# Patient Record
Sex: FEMALE | Race: WHITE | Employment: OTHER | URBAN - NONMETROPOLITAN AREA
[De-identification: names, ages, dates, MRNs, and addresses within clinical notes are randomized per-mention and may not be internally consistent; named-entity substitution may affect disease eponyms.]

---

## 2017-01-05 ENCOUNTER — OFFICE VISIT (OUTPATIENT)
Dept: OPHTHALMOLOGY | Age: 62
End: 2017-01-05

## 2017-01-05 DIAGNOSIS — H40.10X2 COAG (CHRONIC OPEN-ANGLE GLAUCOMA), MODERATE STAGE: Primary | ICD-10-CM

## 2017-01-05 PROCEDURE — 92133 CPTRZD OPH DX IMG PST SGM ON: CPT | Performed by: OPHTHALMOLOGY

## 2017-01-05 PROCEDURE — 92083 EXTENDED VISUAL FIELD XM: CPT | Performed by: OPHTHALMOLOGY

## 2017-01-05 RX ORDER — PHENYLEPHRINE HCL 2.5 %
1 DROPS OPHTHALMIC (EYE) ONCE
Status: COMPLETED | OUTPATIENT
Start: 2017-01-05 | End: 2017-01-05

## 2017-01-05 RX ORDER — TROPICAMIDE 10 MG/ML
1 SOLUTION/ DROPS OPHTHALMIC ONCE
Status: COMPLETED | OUTPATIENT
Start: 2017-01-05 | End: 2017-01-05

## 2017-01-05 RX ADMIN — Medication 1 DROP: at 14:48

## 2017-01-05 RX ADMIN — TROPICAMIDE 1 DROP: 10 SOLUTION/ DROPS OPHTHALMIC at 14:49

## 2017-02-07 ENCOUNTER — OFFICE VISIT (OUTPATIENT)
Dept: OPHTHALMOLOGY | Age: 62
End: 2017-02-07

## 2017-02-07 DIAGNOSIS — Z96.1 PSEUDOPHAKIA OF BOTH EYES: ICD-10-CM

## 2017-02-07 DIAGNOSIS — H47.20: ICD-10-CM

## 2017-02-07 DIAGNOSIS — H40.10X2 COAG (CHRONIC OPEN-ANGLE GLAUCOMA), MODERATE STAGE: Primary | ICD-10-CM

## 2017-02-07 PROCEDURE — 3017F COLORECTAL CA SCREEN DOC REV: CPT | Performed by: OPHTHALMOLOGY

## 2017-02-07 PROCEDURE — G8421 BMI NOT CALCULATED: HCPCS | Performed by: OPHTHALMOLOGY

## 2017-02-07 PROCEDURE — 99214 OFFICE O/P EST MOD 30 MIN: CPT | Performed by: OPHTHALMOLOGY

## 2017-02-07 PROCEDURE — G8427 DOCREV CUR MEDS BY ELIG CLIN: HCPCS | Performed by: OPHTHALMOLOGY

## 2017-02-07 PROCEDURE — G8484 FLU IMMUNIZE NO ADMIN: HCPCS | Performed by: OPHTHALMOLOGY

## 2017-02-07 PROCEDURE — 3014F SCREEN MAMMO DOC REV: CPT | Performed by: OPHTHALMOLOGY

## 2017-02-07 PROCEDURE — 1036F TOBACCO NON-USER: CPT | Performed by: OPHTHALMOLOGY

## 2017-02-07 RX ORDER — PHENYLEPHRINE HCL 2.5 %
1 DROPS OPHTHALMIC (EYE) ONCE
Status: COMPLETED | OUTPATIENT
Start: 2017-02-07 | End: 2017-02-07

## 2017-02-07 RX ORDER — BENOXINATE HCL/FLUORESCEIN SOD 0.4%-0.25%
1 DROPS OPHTHALMIC (EYE) ONCE
Status: COMPLETED | OUTPATIENT
Start: 2017-02-07 | End: 2017-02-07

## 2017-02-07 RX ORDER — TROPICAMIDE 10 MG/ML
1 SOLUTION/ DROPS OPHTHALMIC ONCE
Status: COMPLETED | OUTPATIENT
Start: 2017-02-07 | End: 2017-02-07

## 2017-02-07 RX ADMIN — Medication 1 DROP: at 10:39

## 2017-02-07 RX ADMIN — Medication 1 DROP: at 10:38

## 2017-02-07 RX ADMIN — TROPICAMIDE 1 DROP: 10 SOLUTION/ DROPS OPHTHALMIC at 10:39

## 2017-02-07 ASSESSMENT — ENCOUNTER SYMPTOMS
GASTROINTESTINAL NEGATIVE: 1
RESPIRATORY NEGATIVE: 1

## 2017-02-07 ASSESSMENT — VISUAL ACUITY
METHOD: SNELLEN - LINEAR
CORRECTION_TYPE: GLASSES
OS_CC: 20/100

## 2017-02-07 ASSESSMENT — TONOMETRY
OS_IOP_MMHG: 15
IOP_METHOD: APPLANATION W FLURESS DROP
OD_IOP_MMHG: 18

## 2017-02-07 ASSESSMENT — SLIT LAMP EXAM - LIDS
COMMENTS: NORMAL
COMMENTS: NORMAL

## 2017-07-06 ENCOUNTER — OFFICE VISIT (OUTPATIENT)
Dept: OPHTHALMOLOGY | Age: 62
End: 2017-07-06
Payer: COMMERCIAL

## 2017-07-06 DIAGNOSIS — H40.10X2 COAG (CHRONIC OPEN-ANGLE GLAUCOMA), MODERATE STAGE: Primary | ICD-10-CM

## 2017-07-06 DIAGNOSIS — H47.20: ICD-10-CM

## 2017-07-06 DIAGNOSIS — Z96.1 PSEUDOPHAKIA OF BOTH EYES: ICD-10-CM

## 2017-07-06 PROCEDURE — G8427 DOCREV CUR MEDS BY ELIG CLIN: HCPCS | Performed by: OPHTHALMOLOGY

## 2017-07-06 PROCEDURE — 99213 OFFICE O/P EST LOW 20 MIN: CPT | Performed by: OPHTHALMOLOGY

## 2017-07-06 PROCEDURE — 3017F COLORECTAL CA SCREEN DOC REV: CPT | Performed by: OPHTHALMOLOGY

## 2017-07-06 PROCEDURE — G8421 BMI NOT CALCULATED: HCPCS | Performed by: OPHTHALMOLOGY

## 2017-07-06 PROCEDURE — 3014F SCREEN MAMMO DOC REV: CPT | Performed by: OPHTHALMOLOGY

## 2017-07-06 PROCEDURE — 1036F TOBACCO NON-USER: CPT | Performed by: OPHTHALMOLOGY

## 2017-07-06 RX ORDER — BENOXINATE HCL/FLUORESCEIN SOD 0.4%-0.25%
1 DROPS OPHTHALMIC (EYE) ONCE
Status: COMPLETED | OUTPATIENT
Start: 2017-07-06 | End: 2017-07-06

## 2017-07-06 RX ADMIN — Medication 1 DROP: at 11:22

## 2017-07-06 ASSESSMENT — TONOMETRY
IOP_METHOD: APPLANATION W FLURESS DROP
OS_IOP_MMHG: 14
OD_IOP_MMHG: 15

## 2017-07-06 ASSESSMENT — VISUAL ACUITY
METHOD: SNELLEN - LINEAR
CORRECTION_TYPE: GLASSES
OS_CC: 20/200

## 2017-07-06 ASSESSMENT — ENCOUNTER SYMPTOMS
GASTROINTESTINAL NEGATIVE: 1
RESPIRATORY NEGATIVE: 1

## 2017-07-06 ASSESSMENT — SLIT LAMP EXAM - LIDS
COMMENTS: NORMAL
COMMENTS: NORMAL

## 2017-09-01 RX ORDER — TIMOLOL MALEATE 5 MG/ML
SOLUTION OPHTHALMIC
Qty: 5 ML | Refills: 11 | Status: SHIPPED | OUTPATIENT
Start: 2017-09-01 | End: 2020-03-16 | Stop reason: SDUPTHER

## 2017-11-06 ENCOUNTER — OFFICE VISIT (OUTPATIENT)
Dept: OPHTHALMOLOGY | Age: 62
End: 2017-11-06
Payer: COMMERCIAL

## 2017-11-06 DIAGNOSIS — Z96.1 PSEUDOPHAKIA OF BOTH EYES: ICD-10-CM

## 2017-11-06 DIAGNOSIS — H40.9 MODERATE STAGE GLAUCOMA: Primary | ICD-10-CM

## 2017-11-06 PROCEDURE — G8484 FLU IMMUNIZE NO ADMIN: HCPCS | Performed by: OPHTHALMOLOGY

## 2017-11-06 PROCEDURE — 3017F COLORECTAL CA SCREEN DOC REV: CPT | Performed by: OPHTHALMOLOGY

## 2017-11-06 PROCEDURE — G8427 DOCREV CUR MEDS BY ELIG CLIN: HCPCS | Performed by: OPHTHALMOLOGY

## 2017-11-06 PROCEDURE — 99214 OFFICE O/P EST MOD 30 MIN: CPT | Performed by: OPHTHALMOLOGY

## 2017-11-06 PROCEDURE — G8421 BMI NOT CALCULATED: HCPCS | Performed by: OPHTHALMOLOGY

## 2017-11-06 PROCEDURE — 1036F TOBACCO NON-USER: CPT | Performed by: OPHTHALMOLOGY

## 2017-11-06 PROCEDURE — 3014F SCREEN MAMMO DOC REV: CPT | Performed by: OPHTHALMOLOGY

## 2017-11-06 RX ORDER — BENOXINATE HCL/FLUORESCEIN SOD 0.4%-0.25%
1 DROPS OPHTHALMIC (EYE) ONCE
Status: COMPLETED | OUTPATIENT
Start: 2017-11-06 | End: 2017-11-06

## 2017-11-06 RX ADMIN — Medication 1 DROP: at 11:06

## 2017-11-06 ASSESSMENT — TONOMETRY
OS_IOP_MMHG: 14
OD_IOP_MMHG: 18

## 2017-11-06 ASSESSMENT — ENCOUNTER SYMPTOMS
RESPIRATORY NEGATIVE: 1
GASTROINTESTINAL NEGATIVE: 1

## 2017-11-06 ASSESSMENT — VISUAL ACUITY
METHOD: SNELLEN - LINEAR
CORRECTION_TYPE: GLASSES
OS_CC: 20/100

## 2017-11-06 ASSESSMENT — SLIT LAMP EXAM - LIDS
COMMENTS: NORMAL
COMMENTS: NORMAL

## 2017-11-06 NOTE — PROGRESS NOTES
Kimmie Boudreaux presents today for an IOP check   Chief Complaint   Patient presents with    Follow-up    Glaucoma   . HPI     Glaucoma   In both eyes. Comments   4 month IOP ck  Timolol  Maleate gel forming 1 drop OU every AM,  Last drop  am  No changes in health status since last appointment with me. Having no problems with any other organ system. I have reviewed the HPI I agree and will use it for the pt. HPI. Review of Systems  Review of Systems   Constitutional: Negative. HENT: Negative. Respiratory: Negative. Cardiovascular: Negative. Gastrointestinal: Negative. Musculoskeletal: Negative. Skin: Negative. Neurological: Negative. Endo/Heme/Allergies: Negative. Main Ophthalmology Exam     External Exam       Right Left    External Normal Normal          Slit Lamp Exam       Right Left    Lids/Lashes Normal Normal    Conjunctiva/Sclera White and quiet White and quiet    Cornea Clear Clear    Anterior Chamber Deep and quiet Deep and quiet    Iris Round and reactive Round and reactive    Lens Centered posterior chamber intraocular lens, Open posterior capsule Centered posterior chamber intraocular lens, Open posterior capsule    Vitreous Normal Normal                   Tonometry     Tonometry (11:10 AM)       Right Left    Pressure 18 14              Visual Acuity     Visual Acuity (Snellen - Linear)       Right Left    Dist cc 20/300 20/100    Correction:  Glasses          Visual Acuity #2 (Snellen - Linear)       Right Left    Dist cc 20/20 OU Bioptic RX     Correction:  Glasses              Pupils     Pupils       Pupils React APD    Right PERRL Slow Trace ? Left PERRL Slow None                Not recorded         Extraocular Movement     Extraocular Movement       Right Left     Full, Ortho Full, Ortho                IMPRESSION:  1. Moderate stage glaucoma     2.  Pseudophakia of both eyes         PLAN:  Discussed all below with

## 2018-01-10 ENCOUNTER — OFFICE VISIT (OUTPATIENT)
Dept: PRIMARY CARE CLINIC | Age: 63
End: 2018-01-10
Payer: COMMERCIAL

## 2018-01-10 VITALS
HEIGHT: 67 IN | DIASTOLIC BLOOD PRESSURE: 70 MMHG | HEART RATE: 78 BPM | BODY MASS INDEX: 26.68 KG/M2 | TEMPERATURE: 100.2 F | OXYGEN SATURATION: 94 % | WEIGHT: 170 LBS | SYSTOLIC BLOOD PRESSURE: 138 MMHG

## 2018-01-10 DIAGNOSIS — R50.9 FEVER, UNSPECIFIED FEVER CAUSE: ICD-10-CM

## 2018-01-10 DIAGNOSIS — J10.1 INFLUENZA A: Primary | ICD-10-CM

## 2018-01-10 LAB
INFLUENZA A ANTIBODY: POSITIVE
INFLUENZA B ANTIBODY: NEGATIVE

## 2018-01-10 PROCEDURE — 87804 INFLUENZA ASSAY W/OPTIC: CPT | Performed by: NURSE PRACTITIONER

## 2018-01-10 PROCEDURE — G8427 DOCREV CUR MEDS BY ELIG CLIN: HCPCS | Performed by: NURSE PRACTITIONER

## 2018-01-10 PROCEDURE — 3017F COLORECTAL CA SCREEN DOC REV: CPT | Performed by: NURSE PRACTITIONER

## 2018-01-10 PROCEDURE — 1036F TOBACCO NON-USER: CPT | Performed by: NURSE PRACTITIONER

## 2018-01-10 PROCEDURE — G8484 FLU IMMUNIZE NO ADMIN: HCPCS | Performed by: NURSE PRACTITIONER

## 2018-01-10 PROCEDURE — G8419 CALC BMI OUT NRM PARAM NOF/U: HCPCS | Performed by: NURSE PRACTITIONER

## 2018-01-10 PROCEDURE — 99213 OFFICE O/P EST LOW 20 MIN: CPT | Performed by: NURSE PRACTITIONER

## 2018-01-10 PROCEDURE — 3014F SCREEN MAMMO DOC REV: CPT | Performed by: NURSE PRACTITIONER

## 2018-01-10 RX ORDER — DEXTROMETHORPHAN HYDROBROMIDE AND PROMETHAZINE HYDROCHLORIDE 15; 6.25 MG/5ML; MG/5ML
5 SYRUP ORAL 4 TIMES DAILY PRN
Qty: 180 ML | Refills: 0 | Status: ON HOLD | OUTPATIENT
Start: 2018-01-10 | End: 2018-01-23 | Stop reason: HOSPADM

## 2018-01-10 RX ORDER — BENZONATATE 100 MG/1
100 CAPSULE ORAL 3 TIMES DAILY PRN
Qty: 30 CAPSULE | Refills: 0 | Status: SHIPPED | OUTPATIENT
Start: 2018-01-10 | End: 2018-05-31 | Stop reason: ALTCHOICE

## 2018-01-10 RX ORDER — PREDNISONE 20 MG/1
20 TABLET ORAL DAILY
Qty: 5 TABLET | Refills: 0 | Status: SHIPPED | OUTPATIENT
Start: 2018-01-10 | End: 2018-01-15

## 2018-01-10 ASSESSMENT — ENCOUNTER SYMPTOMS
SORE THROAT: 1
RHINORRHEA: 1
VOMITING: 0
DIARRHEA: 1
SHORTNESS OF BREATH: 1
CHEST TIGHTNESS: 1
COUGH: 1
NAUSEA: 0
WHEEZING: 0
SINUS PRESSURE: 1
ABDOMINAL PAIN: 0

## 2018-01-20 ENCOUNTER — HOSPITAL ENCOUNTER (INPATIENT)
Age: 63
LOS: 3 days | Discharge: HOME | DRG: 247 | End: 2018-01-23
Attending: INTERNAL MEDICINE | Admitting: INTERNAL MEDICINE
Payer: COMMERCIAL

## 2018-01-20 PROBLEM — I21.3 STEMI (ST ELEVATION MYOCARDIAL INFARCTION) (HCC): Status: ACTIVE | Noted: 2018-01-20

## 2018-01-20 LAB
-: ABNORMAL
ALLEN TEST: ABNORMAL
ALLEN TEST: ABNORMAL
AMORPHOUS: ABNORMAL
ANION GAP SERPL CALCULATED.3IONS-SCNC: 17 MMOL/L (ref 9–17)
ANION GAP: 11 MMOL/L (ref 7–16)
BACTERIA: ABNORMAL
BILIRUBIN URINE: NEGATIVE
BUN BLDV-MCNC: 15 MG/DL (ref 8–23)
BUN/CREAT BLD: ABNORMAL (ref 9–20)
CALCIUM SERPL-MCNC: 9 MG/DL (ref 8.6–10.4)
CASTS UA: ABNORMAL /LPF (ref 0–8)
CHLORIDE BLD-SCNC: 101 MMOL/L (ref 98–107)
CHOLESTEROL/HDL RATIO: 3.5
CHOLESTEROL: 170 MG/DL
CO2: 19 MMOL/L (ref 20–31)
COLOR: YELLOW
COMMENT UA: ABNORMAL
CREAT SERPL-MCNC: 0.74 MG/DL (ref 0.5–0.9)
CRYSTALS, UA: ABNORMAL /HPF
EPITHELIAL CELLS UA: ABNORMAL /HPF (ref 0–5)
FIO2: ABNORMAL
FIO2: ABNORMAL
GFR AFRICAN AMERICAN: >60 ML/MIN
GFR NON-AFRICAN AMERICAN: >60 ML/MIN
GFR NON-AFRICAN AMERICAN: >60 ML/MIN
GFR SERPL CREATININE-BSD FRML MDRD: >60 ML/MIN
GFR SERPL CREATININE-BSD FRML MDRD: ABNORMAL ML/MIN/{1.73_M2}
GFR SERPL CREATININE-BSD FRML MDRD: ABNORMAL ML/MIN/{1.73_M2}
GFR SERPL CREATININE-BSD FRML MDRD: NORMAL ML/MIN/{1.73_M2}
GLUCOSE BLD-MCNC: 104 MG/DL (ref 70–99)
GLUCOSE BLD-MCNC: 170 MG/DL (ref 74–100)
GLUCOSE URINE: NEGATIVE
HCT VFR BLD CALC: 43.7 % (ref 36.3–47.1)
HDLC SERPL-MCNC: 49 MG/DL
HEMOGLOBIN: 13.5 G/DL (ref 11.9–15.1)
KETONES, URINE: NEGATIVE
LDL CHOLESTEROL: 96 MG/DL (ref 0–130)
LEUKOCYTE ESTERASE, URINE: NEGATIVE
MCH RBC QN AUTO: 29.9 PG (ref 25.2–33.5)
MCHC RBC AUTO-ENTMCNC: 30.9 G/DL (ref 28.4–34.8)
MCV RBC AUTO: 96.7 FL (ref 82.6–102.9)
MODE: ABNORMAL
MODE: ABNORMAL
MUCUS: ABNORMAL
NEGATIVE BASE EXCESS, ART: 3 (ref 0–2)
NEGATIVE BASE EXCESS, ART: 7 (ref 0–2)
NITRITE, URINE: POSITIVE
NRBC AUTOMATED: 0 PER 100 WBC
O2 DEVICE/FLOW/%: ABNORMAL
O2 DEVICE/FLOW/%: ABNORMAL
OTHER OBSERVATIONS UA: ABNORMAL
PATIENT TEMP: ABNORMAL
PATIENT TEMP: ABNORMAL
PDW BLD-RTO: 14.5 % (ref 11.8–14.4)
PH UA: 6 (ref 5–8)
PLATELET # BLD: 267 K/UL (ref 138–453)
PMV BLD AUTO: 10 FL (ref 8.1–13.5)
POC CHLORIDE: 109 MMOL/L (ref 98–107)
POC CREATININE: 0.74 MG/DL (ref 0.51–1.19)
POC HCO3: 18.8 MMOL/L (ref 21–28)
POC HCO3: 23 MMOL/L (ref 21–28)
POC HEMATOCRIT: 35 % (ref 36–46)
POC HEMOGLOBIN: 11.7 G/DL (ref 12–16)
POC IONIZED CALCIUM: 1.25 MMOL/L (ref 1.15–1.33)
POC LACTIC ACID: 1.08 MMOL/L (ref 0.56–1.39)
POC O2 SATURATION: 100 % (ref 94–98)
POC O2 SATURATION: 79 % (ref 94–98)
POC PCO2 TEMP: ABNORMAL MM HG
POC PCO2 TEMP: ABNORMAL MM HG
POC PCO2: 39.1 MM HG (ref 35–48)
POC PCO2: 46 MM HG (ref 35–48)
POC PH TEMP: ABNORMAL
POC PH TEMP: ABNORMAL
POC PH: 7.29 (ref 7.35–7.45)
POC PH: 7.31 (ref 7.35–7.45)
POC PO2 TEMP: ABNORMAL MM HG
POC PO2 TEMP: ABNORMAL MM HG
POC PO2: 47.7 MM HG (ref 83–108)
POC PO2: 628.2 MM HG (ref 83–108)
POC POTASSIUM: 3.7 MMOL/L (ref 3.5–4.5)
POC SODIUM: 143 MMOL/L (ref 138–146)
POSITIVE BASE EXCESS, ART: ABNORMAL (ref 0–3)
POSITIVE BASE EXCESS, ART: ABNORMAL (ref 0–3)
POTASSIUM SERPL-SCNC: 3.8 MMOL/L (ref 3.7–5.3)
PROTEIN UA: NEGATIVE
RBC # BLD: 4.52 M/UL (ref 3.95–5.11)
RBC UA: ABNORMAL /HPF (ref 0–4)
RENAL EPITHELIAL, UA: ABNORMAL /HPF
SAMPLE SITE: ABNORMAL
SAMPLE SITE: ABNORMAL
SODIUM BLD-SCNC: 137 MMOL/L (ref 135–144)
SPECIFIC GRAVITY UA: 1.01 (ref 1–1.03)
TCO2 (CALC), ART: 20 MMOL/L (ref 22–29)
TCO2 (CALC), ART: 24 MMOL/L (ref 22–29)
TRICHOMONAS: ABNORMAL
TRIGL SERPL-MCNC: 124 MG/DL
TROPONIN INTERP: ABNORMAL
TROPONIN T: 1.56 NG/ML
TURBIDITY: CLEAR
URINE HGB: NEGATIVE
UROBILINOGEN, URINE: NORMAL
VLDLC SERPL CALC-MCNC: NORMAL MG/DL (ref 1–30)
WBC # BLD: 18.2 K/UL (ref 3.5–11.3)
WBC UA: ABNORMAL /HPF (ref 0–5)
YEAST: ABNORMAL

## 2018-01-20 PROCEDURE — 027034Z DILATION OF CORONARY ARTERY, ONE ARTERY WITH DRUG-ELUTING INTRALUMINAL DEVICE, PERCUTANEOUS APPROACH: ICD-10-PCS | Performed by: INTERNAL MEDICINE

## 2018-01-20 PROCEDURE — 84484 ASSAY OF TROPONIN QUANT: CPT

## 2018-01-20 PROCEDURE — C1894 INTRO/SHEATH, NON-LASER: HCPCS

## 2018-01-20 PROCEDURE — C1874 STENT, COATED/COV W/DEL SYS: HCPCS

## 2018-01-20 PROCEDURE — 84132 ASSAY OF SERUM POTASSIUM: CPT

## 2018-01-20 PROCEDURE — 82435 ASSAY OF BLOOD CHLORIDE: CPT

## 2018-01-20 PROCEDURE — 93005 ELECTROCARDIOGRAM TRACING: CPT

## 2018-01-20 PROCEDURE — 92941 PRQ TRLML REVSC TOT OCCL AMI: CPT | Performed by: INTERNAL MEDICINE

## 2018-01-20 PROCEDURE — 6370000000 HC RX 637 (ALT 250 FOR IP): Performed by: STUDENT IN AN ORGANIZED HEALTH CARE EDUCATION/TRAINING PROGRAM

## 2018-01-20 PROCEDURE — 82803 BLOOD GASES ANY COMBINATION: CPT

## 2018-01-20 PROCEDURE — 2500000003 HC RX 250 WO HCPCS

## 2018-01-20 PROCEDURE — 36415 COLL VENOUS BLD VENIPUNCTURE: CPT

## 2018-01-20 PROCEDURE — 82565 ASSAY OF CREATININE: CPT

## 2018-01-20 PROCEDURE — 85027 COMPLETE CBC AUTOMATED: CPT

## 2018-01-20 PROCEDURE — C1887 CATHETER, GUIDING: HCPCS

## 2018-01-20 PROCEDURE — 82947 ASSAY GLUCOSE BLOOD QUANT: CPT

## 2018-01-20 PROCEDURE — 94660 CPAP INITIATION&MGMT: CPT

## 2018-01-20 PROCEDURE — 83605 ASSAY OF LACTIC ACID: CPT

## 2018-01-20 PROCEDURE — 80061 LIPID PANEL: CPT

## 2018-01-20 PROCEDURE — 82330 ASSAY OF CALCIUM: CPT

## 2018-01-20 PROCEDURE — 2580000003 HC RX 258: Performed by: STUDENT IN AN ORGANIZED HEALTH CARE EDUCATION/TRAINING PROGRAM

## 2018-01-20 PROCEDURE — 80048 BASIC METABOLIC PNL TOTAL CA: CPT

## 2018-01-20 PROCEDURE — 4A023N7 MEASUREMENT OF CARDIAC SAMPLING AND PRESSURE, LEFT HEART, PERCUTANEOUS APPROACH: ICD-10-PCS | Performed by: INTERNAL MEDICINE

## 2018-01-20 PROCEDURE — 6360000002 HC RX W HCPCS

## 2018-01-20 PROCEDURE — 84295 ASSAY OF SERUM SODIUM: CPT

## 2018-01-20 PROCEDURE — C1769 GUIDE WIRE: HCPCS

## 2018-01-20 PROCEDURE — C1725 CATH, TRANSLUMIN NON-LASER: HCPCS

## 2018-01-20 PROCEDURE — 6360000002 HC RX W HCPCS: Performed by: STUDENT IN AN ORGANIZED HEALTH CARE EDUCATION/TRAINING PROGRAM

## 2018-01-20 PROCEDURE — 81001 URINALYSIS AUTO W/SCOPE: CPT

## 2018-01-20 PROCEDURE — 85014 HEMATOCRIT: CPT

## 2018-01-20 PROCEDURE — 51702 INSERT TEMP BLADDER CATH: CPT

## 2018-01-20 PROCEDURE — B2111ZZ FLUOROSCOPY OF MULTIPLE CORONARY ARTERIES USING LOW OSMOLAR CONTRAST: ICD-10-PCS | Performed by: INTERNAL MEDICINE

## 2018-01-20 PROCEDURE — B2151ZZ FLUOROSCOPY OF LEFT HEART USING LOW OSMOLAR CONTRAST: ICD-10-PCS | Performed by: INTERNAL MEDICINE

## 2018-01-20 PROCEDURE — 93458 L HRT ARTERY/VENTRICLE ANGIO: CPT | Performed by: INTERNAL MEDICINE

## 2018-01-20 PROCEDURE — 2000000000 HC ICU R&B

## 2018-01-20 RX ORDER — ASPIRIN 81 MG/1
81 TABLET, CHEWABLE ORAL DAILY
Status: DISCONTINUED | OUTPATIENT
Start: 2018-01-21 | End: 2018-01-23 | Stop reason: HOSPADM

## 2018-01-20 RX ORDER — ATORVASTATIN CALCIUM 80 MG/1
80 TABLET, FILM COATED ORAL NIGHTLY
Status: DISCONTINUED | OUTPATIENT
Start: 2018-01-20 | End: 2018-01-23 | Stop reason: HOSPADM

## 2018-01-20 RX ORDER — SODIUM CHLORIDE 0.9 % (FLUSH) 0.9 %
10 SYRINGE (ML) INJECTION EVERY 12 HOURS SCHEDULED
Status: DISCONTINUED | OUTPATIENT
Start: 2018-01-20 | End: 2018-01-23 | Stop reason: HOSPADM

## 2018-01-20 RX ORDER — MULTIVIT-MIN/IRON/FOLIC ACID/K 18-600-40
4000 CAPSULE ORAL DAILY
COMMUNITY

## 2018-01-20 RX ORDER — CLOPIDOGREL BISULFATE 75 MG/1
75 TABLET ORAL DAILY
Status: DISCONTINUED | OUTPATIENT
Start: 2018-01-21 | End: 2018-01-23 | Stop reason: HOSPADM

## 2018-01-20 RX ORDER — CEPHALEXIN 250 MG/1
250 CAPSULE ORAL EVERY 6 HOURS SCHEDULED
Status: DISCONTINUED | OUTPATIENT
Start: 2018-01-20 | End: 2018-01-22

## 2018-01-20 RX ORDER — SODIUM CHLORIDE 0.9 % (FLUSH) 0.9 %
10 SYRINGE (ML) INJECTION PRN
Status: DISCONTINUED | OUTPATIENT
Start: 2018-01-20 | End: 2018-01-23 | Stop reason: HOSPADM

## 2018-01-20 RX ORDER — ACETAMINOPHEN 325 MG/1
650 TABLET ORAL EVERY 4 HOURS PRN
Status: DISCONTINUED | OUTPATIENT
Start: 2018-01-20 | End: 2018-01-23 | Stop reason: HOSPADM

## 2018-01-20 RX ORDER — ONDANSETRON 2 MG/ML
4 INJECTION INTRAMUSCULAR; INTRAVENOUS EVERY 6 HOURS PRN
Status: DISCONTINUED | OUTPATIENT
Start: 2018-01-20 | End: 2018-01-23 | Stop reason: HOSPADM

## 2018-01-20 RX ADMIN — Medication 10 ML: at 21:04

## 2018-01-20 RX ADMIN — ENOXAPARIN SODIUM 40 MG: 40 INJECTION SUBCUTANEOUS at 21:03

## 2018-01-20 RX ADMIN — CEPHALEXIN 250 MG: 250 CAPSULE ORAL at 21:04

## 2018-01-20 RX ADMIN — ATORVASTATIN CALCIUM 80 MG: 80 TABLET, FILM COATED ORAL at 21:04

## 2018-01-20 ASSESSMENT — PAIN SCALES - GENERAL
PAINLEVEL_OUTOF10: 0

## 2018-01-20 NOTE — H&P
Luigi Gonzalez MD Physician Signed Cardiology  H&P Date of Service: 1/20/2018  3:01 PM   Related encounter: LEFT HEART CATH / LV   CORS from 1/20/2018 in [facility] All    []Hide copied text  []Hover for attribution information  Attestation signed by       Attending Physician Statement:     I have discussed the care of  Vicente Ruelas [facility] [address] , including pertinent history and exam findings, with the Cardiology fellow/resident.      I have seen and examined the patient and the key elements of all parts of the encounter have been performed by me. I agree with the assessment, plan and orders as documented by the fellow/resident, after I modified exam findings and plan of treatments, and the final version is my approved version of the assessment.      Additional Comments: The patient presented with acute inferior MI, to [facility]. Referred for emergency [facility] Cardiology Cardiology    Consult / H&P                 Today's Date:  1/20/2018  Patient Name: Vicente Ruelas [facility]  Date of admission:      No admission date for patient encounter. Patient's age:  57 y.o., 1955  Admission Dx: No admission diagnoses are documented for this encounter.     Reason for Consult:  Cardiac evaluation     Requesting Physician: No admitting provider for patient encounter.     CHIEF COMPLAINT:  Chest pain     History Obtained From:  patient, electronic medical record     HISTORY OF PRESENT ILLNESS:       The patient is a 57 y.o.  female who is admitted to the hospital as  Transfer from [facility] with chest pain. She has been life flighted from [facility].  She presented with anginal chest pain, shortness of breath along with nausea. EMS gave 4 tablets of Asa 81 mg. EMS strip ST elevation noted. Received 5000 units heparin, zofran and 600 mg plavix.    Patient has been life flighter during which she had V fib arrest received 150 mg amiodarone and 1 shock with 1 history includes Diabetes in her mother. No h/o sudden cardiac death. No for premature CAD     REVIEW OF SYSTEMS:    · Constitutional:dyspnea and chest pain   · Eyes: No visual changes or diplopia. No scleral icterus. · ENT: No Headaches  · Cardiovascular: No cardiac history  · Respiratory: dyspnea  · Gastrointestinal: No abdominal pain. No change in bowel or bladder habits. · Genitourinary: No dysuria, trouble voiding, or hematuria. · Musculoskeletal:  No gait disturbance, No weakness or joint complaints. · Integumentary: No rash or pruritis. · Neurological: No headache, diplopia, change in muscle strength, numbness or tingling. No change in gait, balance, coordination, mood, affect, memory, mentation, behavior. · Psychiatric: No anxiety, or depression. · Endocrine: No temperature intolerance. No excessive thirst, fluid intake, or urination. No tremor. · Hematologic/Lymphatic: No abnormal bruising or bleeding, blood clots or swollen lymph nodes. · Allergic/Immunologic: No nasal congestion or hives.        PHYSICAL EXAM:      There were no vitals taken for this visit. Constitutional and General Appearance:short of breath   HEENT: PERRL, no cervical lymphadenopathy. No masses palpable. Normal oral mucosa  Respiratory:  · Respiratory distress  · Wheezing is heard  Cardiovascular:  · The apical impulse is not displaced  · Heart tones are crisp and normal. regular S1 and S2.     Abdomen:   · No masses or tenderness  · Bowel sounds present  Extremities:  ·  No Cyanosis or Clubbing  ·  Lower extremity edema: No  ·  Skin: Warm and dry  Neurological:  · Alert and oriented. · Moves all extremities well  · No abnormalities of mood, affect, memory, mentation, or behavior are noted     DATA:    Diagnostics:    EKG: STEMI in lead 2, 3, AvF & Lateral leads V 5, V 6     Labs:      CBC: No results for input(s): WBC, HGB, HCT, PLT in the last 72 hours.   BMP: No results for input(s): NA, K, CO2, BUN, CREATININE, LABGLOM,

## 2018-01-21 ENCOUNTER — APPOINTMENT (OUTPATIENT)
Dept: GENERAL RADIOLOGY | Age: 63
DRG: 247 | End: 2018-01-21
Attending: INTERNAL MEDICINE
Payer: COMMERCIAL

## 2018-01-21 LAB
ANION GAP SERPL CALCULATED.3IONS-SCNC: 12 MMOL/L (ref 9–17)
BUN BLDV-MCNC: 17 MG/DL (ref 8–23)
BUN/CREAT BLD: NORMAL (ref 9–20)
CALCIUM SERPL-MCNC: 8.6 MG/DL (ref 8.6–10.4)
CHLORIDE BLD-SCNC: 105 MMOL/L (ref 98–107)
CO2: 21 MMOL/L (ref 20–31)
CREAT SERPL-MCNC: 0.68 MG/DL (ref 0.5–0.9)
GFR AFRICAN AMERICAN: >60 ML/MIN
GFR NON-AFRICAN AMERICAN: >60 ML/MIN
GFR SERPL CREATININE-BSD FRML MDRD: NORMAL ML/MIN/{1.73_M2}
GFR SERPL CREATININE-BSD FRML MDRD: NORMAL ML/MIN/{1.73_M2}
GLUCOSE BLD-MCNC: 99 MG/DL (ref 70–99)
MAGNESIUM: 2.1 MG/DL (ref 1.6–2.6)
POTASSIUM SERPL-SCNC: 4.1 MMOL/L (ref 3.7–5.3)
SODIUM BLD-SCNC: 138 MMOL/L (ref 135–144)
TROPONIN INTERP: ABNORMAL
TROPONIN T: 2.36 NG/ML
TROPONIN T: 3.43 NG/ML
TROPONIN T: 3.66 NG/ML

## 2018-01-21 PROCEDURE — 2060000000 HC ICU INTERMEDIATE R&B

## 2018-01-21 PROCEDURE — 83735 ASSAY OF MAGNESIUM: CPT

## 2018-01-21 PROCEDURE — 84484 ASSAY OF TROPONIN QUANT: CPT

## 2018-01-21 PROCEDURE — 6370000000 HC RX 637 (ALT 250 FOR IP): Performed by: STUDENT IN AN ORGANIZED HEALTH CARE EDUCATION/TRAINING PROGRAM

## 2018-01-21 PROCEDURE — 71045 X-RAY EXAM CHEST 1 VIEW: CPT

## 2018-01-21 PROCEDURE — 6360000002 HC RX W HCPCS: Performed by: STUDENT IN AN ORGANIZED HEALTH CARE EDUCATION/TRAINING PROGRAM

## 2018-01-21 PROCEDURE — 2580000003 HC RX 258: Performed by: STUDENT IN AN ORGANIZED HEALTH CARE EDUCATION/TRAINING PROGRAM

## 2018-01-21 PROCEDURE — 36415 COLL VENOUS BLD VENIPUNCTURE: CPT

## 2018-01-21 PROCEDURE — 80048 BASIC METABOLIC PNL TOTAL CA: CPT

## 2018-01-21 PROCEDURE — 83036 HEMOGLOBIN GLYCOSYLATED A1C: CPT

## 2018-01-21 RX ORDER — UREA 10 %
3 LOTION (ML) TOPICAL NIGHTLY PRN
Status: DISCONTINUED | OUTPATIENT
Start: 2018-01-21 | End: 2018-01-23 | Stop reason: HOSPADM

## 2018-01-21 RX ORDER — UREA 10 %
3 LOTION (ML) TOPICAL NIGHTLY PRN
Status: DISCONTINUED | OUTPATIENT
Start: 2018-01-21 | End: 2018-01-21

## 2018-01-21 RX ORDER — MIDODRINE HYDROCHLORIDE 2.5 MG/1
2.5 TABLET ORAL
Status: DISCONTINUED | OUTPATIENT
Start: 2018-01-21 | End: 2018-01-21

## 2018-01-21 RX ORDER — MIDODRINE HYDROCHLORIDE 5 MG/1
5 TABLET ORAL
Status: DISCONTINUED | OUTPATIENT
Start: 2018-01-21 | End: 2018-01-21

## 2018-01-21 RX ORDER — SERTRALINE HYDROCHLORIDE 25 MG/1
25 TABLET, FILM COATED ORAL DAILY
Status: DISCONTINUED | OUTPATIENT
Start: 2018-01-21 | End: 2018-01-23 | Stop reason: HOSPADM

## 2018-01-21 RX ORDER — 0.9 % SODIUM CHLORIDE 0.9 %
250 INTRAVENOUS SOLUTION INTRAVENOUS ONCE
Status: COMPLETED | OUTPATIENT
Start: 2018-01-21 | End: 2018-01-21

## 2018-01-21 RX ADMIN — ACETAMINOPHEN 650 MG: 500 TABLET ORAL at 06:27

## 2018-01-21 RX ADMIN — CEPHALEXIN 250 MG: 250 CAPSULE ORAL at 06:27

## 2018-01-21 RX ADMIN — ENOXAPARIN SODIUM 40 MG: 40 INJECTION SUBCUTANEOUS at 09:16

## 2018-01-21 RX ADMIN — CEPHALEXIN 250 MG: 250 CAPSULE ORAL at 17:44

## 2018-01-21 RX ADMIN — ATORVASTATIN CALCIUM 80 MG: 80 TABLET, FILM COATED ORAL at 20:45

## 2018-01-21 RX ADMIN — ACETAMINOPHEN 650 MG: 500 TABLET ORAL at 21:42

## 2018-01-21 RX ADMIN — ACETAMINOPHEN 650 MG: 500 TABLET ORAL at 00:32

## 2018-01-21 RX ADMIN — Medication 10 ML: at 20:46

## 2018-01-21 RX ADMIN — SERTRALINE 25 MG: 25 TABLET, FILM COATED ORAL at 12:31

## 2018-01-21 RX ADMIN — Medication 3 MG: at 21:42

## 2018-01-21 RX ADMIN — Medication 3 MG: at 00:43

## 2018-01-21 RX ADMIN — ONDANSETRON 4 MG: 2 INJECTION INTRAMUSCULAR; INTRAVENOUS at 05:56

## 2018-01-21 RX ADMIN — ASPIRIN 81 MG: 81 TABLET, CHEWABLE ORAL at 09:17

## 2018-01-21 RX ADMIN — CEPHALEXIN 250 MG: 250 CAPSULE ORAL at 00:30

## 2018-01-21 RX ADMIN — CEPHALEXIN 250 MG: 250 CAPSULE ORAL at 12:30

## 2018-01-21 RX ADMIN — SODIUM CHLORIDE 250 ML: 9 INJECTION, SOLUTION INTRAVENOUS at 06:41

## 2018-01-21 RX ADMIN — CLOPIDOGREL 75 MG: 75 TABLET, FILM COATED ORAL at 09:17

## 2018-01-21 ASSESSMENT — PAIN SCALES - GENERAL
PAINLEVEL_OUTOF10: 4
PAINLEVEL_OUTOF10: 0
PAINLEVEL_OUTOF10: 3
PAINLEVEL_OUTOF10: 0
PAINLEVEL_OUTOF10: 1
PAINLEVEL_OUTOF10: 0
PAINLEVEL_OUTOF10: 2
PAINLEVEL_OUTOF10: 0
PAINLEVEL_OUTOF10: 0

## 2018-01-21 NOTE — PROGRESS NOTES
RN received order for Keflex 250 mg q6hr PO from Dr. Shreyas Red for positive UTI, will continue to monitor.

## 2018-01-21 NOTE — PROGRESS NOTES
bilaterally, no basilar rales, no wheezing   Heart: regular rate and rhythm, S1, S2 normal, no murmur, click, rub or gallop  Abdomen: soft, non-tender; bowel sounds normal  Extremities: right groin site shows no hematoma  Neurologic: Mental status: Alert, oriented. Motor and sensory not done. 1/20  EKG: ST SEGMENT CHANGES HAVE RESOLVED      Echocardiogram: pending    1/20/18  Coronary Angiography:   LMCA mild disease  LAD 50-60% mid stenosis  LCX 40% mid stenosis  % mid stenosis reduced to 0% ANASTACIA. KANWAL 3 flow restored. 40% RPL stenosis. EF 45% with inferior wall hypokinesia  No complications. Patient is chest pain free, hemodynamically stable, resolution of ST changes. ABG showed oxygen saturation of 99%. Dual,antiplatelet therapy, statin, BB and risk factor modifications      Assessment / Acute Cardiac Problems:   1. Inferior STEMI S/P MID RCA ANASTACIA( 1/20)  2. Dyslipidemia  3. V fib arrest S/P Amio and shock  4. Smoker  5. Multiple sclerosis  6. Hypotension  7. Allergic to iodine dye   8. Hypotension    Patient Active Problem List:     Open-angle glaucoma     Bilateral pseudophakia     Opaque posterior capsule     Optic atrophy     Hyperopia of both eyes with astigmatism and presbyopia     STEMI (ST elevation myocardial infarction) ([facility] [address]      Plan of Treatment:   1. Will trend the troponin. Continue with asa and plavix. lipitor. Will add midodrine 5 mg. Will hold BB as pt has been bradycardiac. Will add once HR is stable. Lopressor 12.5 mg BID. Will add the lisinopril 2.5 mg once able to tolerate it  2. Will follow with 2 D ECHO  3. His MS medication has been resumed      Discussed with patient and nursing. Lola Betancourt MD  Fellow, Cardiovascular Diseases   [facility]   Attending Physician Statement  I have discussed the care of the patient, including pertinent history and exam findings, with the resident.  I have seen and examined the patient and the key elements of all Parent

## 2018-01-22 PROBLEM — N30.00 ACUTE CYSTITIS: Status: ACTIVE | Noted: 2018-01-22

## 2018-01-22 PROBLEM — G35 MULTIPLE SCLEROSIS (HCC): Status: ACTIVE | Noted: 2018-01-22

## 2018-01-22 LAB
ABSOLUTE EOS #: 0.12 K/UL (ref 0–0.44)
ABSOLUTE IMMATURE GRANULOCYTE: 0.03 K/UL (ref 0–0.3)
ABSOLUTE LYMPH #: 3.56 K/UL (ref 1.1–3.7)
ABSOLUTE MONO #: 1.05 K/UL (ref 0.1–1.2)
BASOPHILS # BLD: 0 % (ref 0–2)
BASOPHILS ABSOLUTE: 0.04 K/UL (ref 0–0.2)
DIFFERENTIAL TYPE: ABNORMAL
EKG ATRIAL RATE: 49 BPM
EKG ATRIAL RATE: 61 BPM
EKG P AXIS: 56 DEGREES
EKG P AXIS: 70 DEGREES
EKG P-R INTERVAL: 158 MS
EKG P-R INTERVAL: 164 MS
EKG Q-T INTERVAL: 490 MS
EKG Q-T INTERVAL: 516 MS
EKG QRS DURATION: 78 MS
EKG QRS DURATION: 86 MS
EKG QTC CALCULATION (BAZETT): 442 MS
EKG QTC CALCULATION (BAZETT): 519 MS
EKG R AXIS: 13 DEGREES
EKG T AXIS: -60 DEGREES
EKG T AXIS: -77 DEGREES
EKG VENTRICULAR RATE: 49 BPM
EKG VENTRICULAR RATE: 61 BPM
EOSINOPHILS RELATIVE PERCENT: 1 % (ref 1–4)
ESTIMATED AVERAGE GLUCOSE: 120 MG/DL
HBA1C MFR BLD: 5.8 % (ref 4–6)
HCT VFR BLD CALC: 35.6 % (ref 36.3–47.1)
HEMOGLOBIN: 11.4 G/DL (ref 11.9–15.1)
IMMATURE GRANULOCYTES: 0 %
LV EF: 55 %
LVEF MODALITY: NORMAL
LYMPHOCYTES # BLD: 33 % (ref 24–43)
MCH RBC QN AUTO: 29.8 PG (ref 25.2–33.5)
MCHC RBC AUTO-ENTMCNC: 32 G/DL (ref 28.4–34.8)
MCV RBC AUTO: 93 FL (ref 82.6–102.9)
MONOCYTES # BLD: 10 % (ref 3–12)
NRBC AUTOMATED: 0 PER 100 WBC
PDW BLD-RTO: 14.9 % (ref 11.8–14.4)
PLATELET # BLD: 247 K/UL (ref 138–453)
PLATELET ESTIMATE: ABNORMAL
PMV BLD AUTO: 10.2 FL (ref 8.1–13.5)
RBC # BLD: 3.83 M/UL (ref 3.95–5.11)
RBC # BLD: ABNORMAL 10*6/UL
SEG NEUTROPHILS: 56 % (ref 36–65)
SEGMENTED NEUTROPHILS ABSOLUTE COUNT: 6.11 K/UL (ref 1.5–8.1)
TROPONIN INTERP: ABNORMAL
TROPONIN INTERP: ABNORMAL
TROPONIN T: 1.51 NG/ML
TROPONIN T: 2.13 NG/ML
WBC # BLD: 10.9 K/UL (ref 3.5–11.3)
WBC # BLD: ABNORMAL 10*3/UL

## 2018-01-22 PROCEDURE — 84484 ASSAY OF TROPONIN QUANT: CPT

## 2018-01-22 PROCEDURE — 93005 ELECTROCARDIOGRAM TRACING: CPT

## 2018-01-22 PROCEDURE — 36415 COLL VENOUS BLD VENIPUNCTURE: CPT

## 2018-01-22 PROCEDURE — 2060000000 HC ICU INTERMEDIATE R&B

## 2018-01-22 PROCEDURE — 99253 IP/OBS CNSLTJ NEW/EST LOW 45: CPT | Performed by: FAMILY MEDICINE

## 2018-01-22 PROCEDURE — 94762 N-INVAS EAR/PLS OXIMTRY CONT: CPT

## 2018-01-22 PROCEDURE — 87086 URINE CULTURE/COLONY COUNT: CPT

## 2018-01-22 PROCEDURE — 85025 COMPLETE CBC W/AUTO DIFF WBC: CPT

## 2018-01-22 PROCEDURE — 93306 TTE W/DOPPLER COMPLETE: CPT

## 2018-01-22 PROCEDURE — 6370000000 HC RX 637 (ALT 250 FOR IP): Performed by: STUDENT IN AN ORGANIZED HEALTH CARE EDUCATION/TRAINING PROGRAM

## 2018-01-22 PROCEDURE — 6360000002 HC RX W HCPCS

## 2018-01-22 PROCEDURE — 2580000003 HC RX 258: Performed by: STUDENT IN AN ORGANIZED HEALTH CARE EDUCATION/TRAINING PROGRAM

## 2018-01-22 PROCEDURE — 6360000002 HC RX W HCPCS: Performed by: STUDENT IN AN ORGANIZED HEALTH CARE EDUCATION/TRAINING PROGRAM

## 2018-01-22 PROCEDURE — 87040 BLOOD CULTURE FOR BACTERIA: CPT

## 2018-01-22 RX ORDER — LISINOPRIL 2.5 MG/1
2.5 TABLET ORAL DAILY
Status: DISCONTINUED | OUTPATIENT
Start: 2018-01-22 | End: 2018-01-23 | Stop reason: HOSPADM

## 2018-01-22 RX ORDER — CEPHALEXIN 500 MG/1
500 CAPSULE ORAL EVERY 12 HOURS SCHEDULED
Status: DISCONTINUED | OUTPATIENT
Start: 2018-01-23 | End: 2018-01-23 | Stop reason: HOSPADM

## 2018-01-22 RX ORDER — CEPHALEXIN 500 MG/1
500 CAPSULE ORAL 2 TIMES DAILY
Qty: 12 CAPSULE | Refills: 0 | Status: SHIPPED | OUTPATIENT
Start: 2018-01-22 | End: 2018-01-23 | Stop reason: HOSPADM

## 2018-01-22 RX ADMIN — LISINOPRIL 2.5 MG: 2.5 TABLET ORAL at 09:36

## 2018-01-22 RX ADMIN — ASPIRIN 81 MG: 81 TABLET, CHEWABLE ORAL at 07:51

## 2018-01-22 RX ADMIN — CEFTRIAXONE SODIUM 1 G: 1 INJECTION, POWDER, FOR SOLUTION INTRAMUSCULAR; INTRAVENOUS at 09:37

## 2018-01-22 RX ADMIN — Medication 10 ML: at 20:13

## 2018-01-22 RX ADMIN — Medication 3 MG: at 20:19

## 2018-01-22 RX ADMIN — CEPHALEXIN 250 MG: 250 CAPSULE ORAL at 05:24

## 2018-01-22 RX ADMIN — SERTRALINE 25 MG: 25 TABLET, FILM COATED ORAL at 07:51

## 2018-01-22 RX ADMIN — ENOXAPARIN SODIUM 40 MG: 40 INJECTION SUBCUTANEOUS at 07:52

## 2018-01-22 RX ADMIN — CEPHALEXIN 250 MG: 250 CAPSULE ORAL at 00:06

## 2018-01-22 RX ADMIN — ATORVASTATIN CALCIUM 80 MG: 80 TABLET, FILM COATED ORAL at 20:13

## 2018-01-22 RX ADMIN — CLOPIDOGREL 75 MG: 75 TABLET, FILM COATED ORAL at 07:51

## 2018-01-22 ASSESSMENT — PAIN SCALES - GENERAL: PAINLEVEL_OUTOF10: 0

## 2018-01-22 NOTE — PROGRESS NOTES
[facility]   Progress Note                   Date:   1/22/2018  Patient name: Andrey Neri  Date of admission:  1/20/2018  2:47 PM  MRN:   [id number]  YOB: 1955  PCP: Raissa Helton MD    Reason for Admission:  Chest pain    Subjective:       Chest pain has resolved. Denies any shortness of breath, palpitation and dizziness    Medications:   Scheduled Meds:   lisinopril  2.5 mg Oral Daily    nicotine  1 patch Transdermal Daily    Interferon Beta-1b  250 mcg Subcutaneous Every Other Day    sertraline  25 mg Oral Daily    sodium chloride flush  10 mL Intravenous 2 times per day    atorvastatin  80 mg Oral Nightly    aspirin  81 mg Oral Daily    clopidogrel  75 mg Oral Daily    enoxaparin  40 mg Subcutaneous Daily    cephALEXin  250 mg Oral 4 times per day       Continuous Infusions:     CBC:   Recent Labs      01/20/18   1708   WBC  18.2*   HGB  13.5   PLT  267     BMP:    Recent Labs      01/20/18   1529  01/20/18   1708  01/21/18   0509   NA   --   137  138   K   --   3.8  4.1   CL   --   101  105   CO2   --   19*  21   BUN   --   15  17   CREATININE  0.74  0.74  0.68   GLUCOSE   --   104*  99     Hepatic: No results for input(s): AST, ALT, ALB, BILITOT, ALKPHOS in the last 72 hours. Troponin: No results for input(s): TROPONINI in the last 72 hours. BNP: No results for input(s): BNP in the last 72 hours. Lipids:   Recent Labs      01/20/18   1708   CHOL  170   HDL  49     INR: No results for input(s): INR in the last 72 hours.     Objective:   Vitals: BP (!) 112/54   Pulse 58   Temp 99.1 °F (37.3 °C) (Oral)   Resp 14   Ht 5' 7\" (1.702 m)   Wt 173 lb 1 oz (78.5 kg)   SpO2 95%   BMI 27.11 kg/m²     General appearance: awake, alert, in no apparent respiratory distress   HEENT: Head: Normocephalic, no lesions, without obvious abnormality  Neck: no JVD  Lungs: right sided basal rales present  Heart: regular rate and rhythm, S1, S2 normal, no murmur, click, rub or gallop  Abdomen: soft, non-tender; bowel sounds normal  Extremities: right groin site shows no hematoma  Neurologic: Mental status: Alert, oriented. Motor and sensory not done. 1/20  EKG: ST SEGMENT CHANGES HAVE RESOLVED      Echocardiogram: pending    1/20/18  Coronary Angiography:   LMCA mild disease  LAD 50-60% mid stenosis  LCX 40% mid stenosis  % mid stenosis reduced to 0% ANASTACIA. KANWAL 3 flow restored. 40% RPL stenosis. EF 45% with inferior wall hypokinesia  No complications. Patient is chest pain free, hemodynamically stable, resolution of ST changes. ABG showed oxygen saturation of 99%. Dual,antiplatelet therapy, statin, BB and risk factor modifications      Assessment / Acute Cardiac Problems:   1. Inferior STEMI S/P MID RCA ANASTACIA( 1/20)  2. Dyslipidemia  3. V fib arrest S/P Amio and shock  4. Smoker  5. Multiple sclerosis  6. Hypotension  7. Allergic to iodine dye   8. Hypotension  9. UTI    Patient Active Problem List:     Open-angle glaucoma     Bilateral pseudophakia     Opaque posterior capsule     Optic atrophy     Hyperopia of both eyes with astigmatism and presbyopia     STEMI (ST elevation myocardial infarction) ([address]      Plan of Treatment:   1. Trop has been trending down. Asa and plavix and lipitor. Lisinopril 2.5 mg added. Hold BB. Will follow with 2 D ECHO  2. Will start the ceftriaxone for  UTI. Will follow urine culture for the patient. IM Consult for the UTI. 3. MS medication has been resumed    Discussed with patient and nursing. Marita Valenzuela MD  Fellow, Cardiovascular Diseases   [facility]     Attending Cardiologist Addendum: I have reviewed and performed the history, physical, subjective, objective, assessment, and plan with the resident/fellow and agree with the note. I performed the history and physical personally. I have made changes to the note above as needed.     Awaiting 2d echo  Consult cardiac rehab  Continue ASA, plavix, statin, acei  No BB due to bradycardia  UTI and MS- consult IM for med management. Thank you for allowing me to participate in the care of this patient, please do not hesitate to call if you have any questions. Verónica Powell, [id number] [facility]  [facility]  [phone]

## 2018-01-22 NOTE — FLOWSHEET NOTE
01/22/18 1116   Encounter Summary   Services provided to: Patient and family together   Place of [address], [place]    Contact Zoroastrian No   Continue Visiting (1/22/18)   Complexity of Encounter Low   Length of Encounter 30 minutes   Spiritual Assessment Completed Yes   Routine   Type Initial     I visited the patient on rounds. I found her sitting in the recliner next to her bed. She was awake and alert. Her brother, Corinne Kelley, and her daughter, Karen Seymour, were in the room with her. She told me that she had a heart attack on Saturday and was taken to the cath lab for a stent. She told me that heart problems are an issue in her family. She told me that she is feeling much better and is hopeful of going home today. She indicated that she has good support from her family including her 2 daughters and a son. She lives with her son. Patient indicates that she attends [address] [place] [address] in [place] and that her  has been to the hospital to visit and pray with her. She was receptive to my visit and spiritual care and welcomed my offer to pray for her. Chaplains are available for further spiritual and emotional support as needed.

## 2018-01-23 VITALS
RESPIRATION RATE: 17 BRPM | TEMPERATURE: 97.7 F | DIASTOLIC BLOOD PRESSURE: 54 MMHG | WEIGHT: 173.06 LBS | SYSTOLIC BLOOD PRESSURE: 105 MMHG | HEIGHT: 67 IN | BODY MASS INDEX: 27.16 KG/M2 | HEART RATE: 56 BPM | OXYGEN SATURATION: 96 %

## 2018-01-23 LAB
CULTURE: NO GROWTH
CULTURE: NORMAL
Lab: NORMAL
SPECIMEN DESCRIPTION: NORMAL
STATUS: NORMAL

## 2018-01-23 PROCEDURE — 99406 BEHAV CHNG SMOKING 3-10 MIN: CPT

## 2018-01-23 PROCEDURE — 94762 N-INVAS EAR/PLS OXIMTRY CONT: CPT

## 2018-01-23 PROCEDURE — 6370000000 HC RX 637 (ALT 250 FOR IP): Performed by: FAMILY MEDICINE

## 2018-01-23 PROCEDURE — 6360000002 HC RX W HCPCS: Performed by: STUDENT IN AN ORGANIZED HEALTH CARE EDUCATION/TRAINING PROGRAM

## 2018-01-23 PROCEDURE — 99231 SBSQ HOSP IP/OBS SF/LOW 25: CPT | Performed by: FAMILY MEDICINE

## 2018-01-23 PROCEDURE — 6370000000 HC RX 637 (ALT 250 FOR IP): Performed by: STUDENT IN AN ORGANIZED HEALTH CARE EDUCATION/TRAINING PROGRAM

## 2018-01-23 RX ORDER — ASPIRIN 81 MG/1
81 TABLET, CHEWABLE ORAL DAILY
Qty: 30 TABLET | Refills: 3 | Status: SHIPPED | OUTPATIENT
Start: 2018-01-24 | End: 2018-05-18 | Stop reason: SDUPTHER

## 2018-01-23 RX ORDER — ATORVASTATIN CALCIUM 80 MG/1
80 TABLET, FILM COATED ORAL NIGHTLY
Qty: 30 TABLET | Refills: 3 | Status: SHIPPED | OUTPATIENT
Start: 2018-01-23 | End: 2018-05-18

## 2018-01-23 RX ORDER — CEPHALEXIN 500 MG/1
500 CAPSULE ORAL 2 TIMES DAILY
Qty: 10 CAPSULE | Refills: 0 | Status: SHIPPED | OUTPATIENT
Start: 2018-01-23 | End: 2018-01-28

## 2018-01-23 RX ORDER — CEPHALEXIN 500 MG/1
500 CAPSULE ORAL 2 TIMES DAILY
Qty: 10 CAPSULE | Refills: 0 | Status: SHIPPED | OUTPATIENT
Start: 2018-01-23 | End: 2018-01-23

## 2018-01-23 RX ORDER — LISINOPRIL 2.5 MG/1
2.5 TABLET ORAL DAILY
Qty: 30 TABLET | Refills: 3 | Status: SHIPPED | OUTPATIENT
Start: 2018-01-24 | End: 2018-05-18 | Stop reason: CLARIF

## 2018-01-23 RX ORDER — CLOPIDOGREL BISULFATE 75 MG/1
75 TABLET ORAL DAILY
Qty: 30 TABLET | Refills: 3 | Status: SHIPPED | OUTPATIENT
Start: 2018-01-24 | End: 2018-05-18 | Stop reason: SDUPTHER

## 2018-01-23 RX ADMIN — LISINOPRIL 2.5 MG: 2.5 TABLET ORAL at 09:08

## 2018-01-23 RX ADMIN — ASPIRIN 81 MG: 81 TABLET, CHEWABLE ORAL at 09:08

## 2018-01-23 RX ADMIN — CEPHALEXIN 500 MG: 500 CAPSULE ORAL at 09:06

## 2018-01-23 RX ADMIN — SERTRALINE 25 MG: 25 TABLET, FILM COATED ORAL at 09:08

## 2018-01-23 RX ADMIN — CLOPIDOGREL 75 MG: 75 TABLET, FILM COATED ORAL at 09:08

## 2018-01-23 RX ADMIN — ENOXAPARIN SODIUM 40 MG: 40 INJECTION SUBCUTANEOUS at 09:06

## 2018-01-23 NOTE — PROGRESS NOTES
Barbie Mukherjee [facility]    Progress Note    1/23/2018    6:00 PM    Name:   Daniel Rodriguez  MRN:     [id number]     Acct:      [de-identified]   Room:   [address] [place] [address] Day:  3  Admit Date:  1/20/2018  2:47 PM    PCP:   Frances Pineda MD  Code Status:  Full Code    Subjective:     C/C: Chest pain  Interval History Status: improved. Patient seen and examined at bedside, no acute events overnight. BB were stopped 2/2 bradycardia  Patient denies any chest pain, shortness of breath, chills, fevers, nausea or vomiting. Patient vitals, labs and all providers notes were reviewed,from overnight shift and morning updates were noted and discussed with the nurse      Brief History:     59-year-old female admitted to the hospital as a transfer from [place] with chest pain. During her EMS flight it was noted that she has ST elevation on the strip and was given 4 tablets of aspirin 81 mg, 5000 units of heparin, Zofran and 600 mg of Plavix, during his flight as well as she developed V. fib arrest and received all 50 mg of amiodarone shocked once , epi once was ROSC   Patient had inferior STEMI status post emergent cardiac cath that showed   LMCA mild disease  LAD 50-60% mid stenosis  LCX 40% mid stenosis  % mid stenosis reduced to 0% ANASTACIA. KANWAL 3 flow restored. 40% RPL stenosis. EF 45% with inferior wall hypokinesia     During patient initial labs she was found to have UTI was nitrite positive and her urine   Patient also takes Betaseron shots every other day to her multiple sclerosis   Last flare up was a year ago.     Review of Systems:     Constitutional:  negative for chills, fevers, sweats  Respiratory:  negative for cough, dyspnea on exertion, hemoptysis, shortness of breath, wheezing  Cardiovascular:  negative for chest pain, chest pressure/discomfort, lower extremity edema, palpitations  Gastrointestinal:  negative for abdominal pain, constipation, 0 ml   Output              300 ml   Net             -300 ml       Labs:    Hematology:  Recent Labs      01/22/18   1234   WBC  10.9   RBC  3.83*   HGB  11.4*   HCT  35.6*   MCV  93.0   MCH  29.8   MCHC  32.0   RDW  14.9*   PLT  247   MPV  10.2     Chemistry:  Recent Labs      01/21/18   0509  01/21/18   1515  01/22/18   0000  01/22/18   0832   NA  138   --    --    --    K  4.1   --    --    --    CL  105   --    --    --    CO2  21   --    --    --    GLUCOSE  99   --    --    --    BUN  17   --    --    --    CREATININE  0.68   --    --    --    MG  2.1   --    --    --    ANIONGAP  12   --    --    --    LABGLOM  >60   --    --    --    GFRAA  >60   --    --    --    CALCIUM  8.6   --    --    --    TROPONINT  3.66*  2.36*  2.13*  1.51*     Recent Labs      01/21/18   0509   LABA1C  5.8         Lab Results   Component Value Date/Time    SPECIAL NOT REPORTED 01/22/2018 01:05 PM     Lab Results   Component Value Date/Time    CULTURE NO GROWTH 01/22/2018 01:05 PM    CULTURE  01/22/2018 01:05 PM     [address] [phone]       Lab Results   Component Value Date    POCPH 7.307 01/20/2018    POCPCO2 46.0 01/20/2018    POCPO2 628.2 01/20/2018    POCHCO3 23.0 01/20/2018    NBEA 3 01/20/2018    PBEA NOT REPORTED 01/20/2018    JIC0WLJ 24 01/20/2018    AGKN9LLK 100 01/20/2018    FIO2 NOT REPORTED 01/20/2018       Radiology:        Physical Examination:        General appearance:  alert, cooperative and no distress  Mental Status:  oriented to person, place and time and normal affect  Lungs:  clear to auscultation bilaterally, normal effort  Heart:  regular rate and rhythm, no murmur  Abdomen:  soft, nontender, nondistended, normal bowel sounds, no masses, hepatomegaly, splenomegaly  Extremities:  no edema, redness, tenderness in the calves  Skin:  no gross lesions, rashes, induration    Assessment:        Primary Problem  STEMI (ST elevation myocardial infarction)

## 2018-01-23 NOTE — PROGRESS NOTES
Smoking Cessation - topics covered   [x]  Health Risks  [x]  Benefits of Quitting   [x]  Smoking Cessation  []  Patient has no history of tobacco use  []  Patient is former smoker. []  No need for tobacco cessation education. [x]  Booklet given  [x]  Patient verbalizes understanding. []  Patient denies need for tobacco cessation education.   Deidre Mora  1:11 PM

## 2018-01-23 NOTE — CARE COORDINATION
Met with patient to discuss discharge planning.   Patient plans on going home independently    Discharge [facility] Case Management Department  Written by: Sara Quinn RN    Patient Name: Parag Perera  Attending Provider: Marquise Raphael MD  Admit Date: 2018  2:47 PM  MRN: [id number]  Account: [de-identified]                     : 1955  Discharge Date: 18      Disposition: home    Sara Quinn RN
support        Electronically signed by Christopher Fonseca RN on 1/21/18 at 10:57 AM

## 2018-01-28 LAB
CULTURE: NORMAL
Lab: NORMAL
Lab: NORMAL
SPECIMEN DESCRIPTION: NORMAL
SPECIMEN DESCRIPTION: NORMAL
STATUS: NORMAL
STATUS: NORMAL

## 2018-02-16 ENCOUNTER — OFFICE VISIT (OUTPATIENT)
Dept: CARDIOLOGY CLINIC | Age: 63
End: 2018-02-16
Payer: COMMERCIAL

## 2018-02-16 VITALS
BODY MASS INDEX: 25.37 KG/M2 | WEIGHT: 162 LBS | SYSTOLIC BLOOD PRESSURE: 128 MMHG | HEART RATE: 64 BPM | DIASTOLIC BLOOD PRESSURE: 80 MMHG

## 2018-02-16 DIAGNOSIS — Z72.0 TOBACCO ABUSE: ICD-10-CM

## 2018-02-16 DIAGNOSIS — E78.2 MIXED HYPERLIPIDEMIA: ICD-10-CM

## 2018-02-16 DIAGNOSIS — I25.10 CORONARY ARTERY DISEASE INVOLVING NATIVE CORONARY ARTERY OF NATIVE HEART WITHOUT ANGINA PECTORIS: ICD-10-CM

## 2018-02-16 DIAGNOSIS — Z95.5 S/P PRIMARY ANGIOPLASTY WITH CORONARY STENT: ICD-10-CM

## 2018-02-16 DIAGNOSIS — I21.11 ST ELEVATION MYOCARDIAL INFARCTION INVOLVING RIGHT CORONARY ARTERY (HCC): Primary | ICD-10-CM

## 2018-02-16 PROCEDURE — 99204 OFFICE O/P NEW MOD 45 MIN: CPT | Performed by: INTERNAL MEDICINE

## 2018-02-16 RX ORDER — OXYCODONE HYDROCHLORIDE AND ACETAMINOPHEN 5; 325 MG/1; MG/1
1 TABLET ORAL EVERY 4 HOURS PRN
COMMUNITY

## 2018-02-16 RX ORDER — TIZANIDINE 4 MG/1
4 TABLET ORAL EVERY 6 HOURS PRN
COMMUNITY

## 2018-02-16 RX ORDER — CYANOCOBALAMIN 1000 UG/ML
1000 INJECTION INTRAMUSCULAR; SUBCUTANEOUS
COMMUNITY

## 2018-04-26 ENCOUNTER — TELEPHONE (OUTPATIENT)
Dept: CARDIOLOGY CLINIC | Age: 63
End: 2018-04-26

## 2018-04-26 DIAGNOSIS — E78.2 MIXED HYPERLIPIDEMIA: ICD-10-CM

## 2018-04-26 DIAGNOSIS — I25.10 CORONARY ARTERY DISEASE INVOLVING NATIVE CORONARY ARTERY OF NATIVE HEART WITHOUT ANGINA PECTORIS: ICD-10-CM

## 2018-04-26 DIAGNOSIS — Z95.5 S/P PRIMARY ANGIOPLASTY WITH CORONARY STENT: ICD-10-CM

## 2018-04-26 DIAGNOSIS — Z72.0 TOBACCO ABUSE: ICD-10-CM

## 2018-04-26 DIAGNOSIS — I21.11 ST ELEVATION MYOCARDIAL INFARCTION INVOLVING RIGHT CORONARY ARTERY (HCC): ICD-10-CM

## 2018-04-26 LAB
CHOLESTEROL, TOTAL: 118 MG/DL
CHOLESTEROL/HDL RATIO: 2.1
HDLC SERPL-MCNC: 55 MG/DL (ref 35–70)
LDL CHOLESTEROL CALCULATED: 35.4 MG/DL (ref 0–160)
TRIGL SERPL-MCNC: 138 MG/DL
VLDLC SERPL CALC-MCNC: 28 MG/DL

## 2018-04-30 RX ORDER — PRAVASTATIN SODIUM 40 MG
40 TABLET ORAL EVERY EVENING
Qty: 90 TABLET | Refills: 3 | Status: SHIPPED | OUTPATIENT
Start: 2018-04-30 | End: 2018-05-18

## 2018-05-18 ENCOUNTER — OFFICE VISIT (OUTPATIENT)
Dept: CARDIOLOGY CLINIC | Age: 63
End: 2018-05-18
Payer: COMMERCIAL

## 2018-05-18 VITALS
DIASTOLIC BLOOD PRESSURE: 70 MMHG | SYSTOLIC BLOOD PRESSURE: 120 MMHG | BODY MASS INDEX: 27.1 KG/M2 | WEIGHT: 173 LBS | HEART RATE: 68 BPM

## 2018-05-18 DIAGNOSIS — E78.5 HYPERLIPIDEMIA LDL GOAL <70: ICD-10-CM

## 2018-05-18 DIAGNOSIS — I25.10 CORONARY ARTERY DISEASE INVOLVING NATIVE CORONARY ARTERY OF NATIVE HEART WITHOUT ANGINA PECTORIS: Primary | ICD-10-CM

## 2018-05-18 DIAGNOSIS — Z72.0 TOBACCO ABUSE: ICD-10-CM

## 2018-05-18 DIAGNOSIS — Z95.5 S/P PRIMARY ANGIOPLASTY WITH CORONARY STENT: ICD-10-CM

## 2018-05-18 DIAGNOSIS — I21.11 ST ELEVATION MYOCARDIAL INFARCTION INVOLVING RIGHT CORONARY ARTERY (HCC): ICD-10-CM

## 2018-05-18 DIAGNOSIS — E78.2 MIXED HYPERLIPIDEMIA: ICD-10-CM

## 2018-05-18 PROCEDURE — 99213 OFFICE O/P EST LOW 20 MIN: CPT | Performed by: INTERNAL MEDICINE

## 2018-05-18 RX ORDER — LOSARTAN POTASSIUM 25 MG/1
25 TABLET ORAL DAILY
COMMUNITY
End: 2018-10-05 | Stop reason: SDUPTHER

## 2018-05-18 RX ORDER — ASPIRIN 81 MG/1
81 TABLET, CHEWABLE ORAL DAILY
Qty: 90 TABLET | Refills: 3 | Status: SHIPPED | OUTPATIENT
Start: 2018-05-18 | End: 2020-02-18 | Stop reason: SDUPTHER

## 2018-05-18 RX ORDER — ROSUVASTATIN CALCIUM 20 MG/1
20 TABLET, COATED ORAL NIGHTLY
Qty: 90 TABLET | Refills: 3 | Status: SHIPPED | OUTPATIENT
Start: 2018-05-18 | End: 2019-05-13 | Stop reason: SDUPTHER

## 2018-05-18 RX ORDER — CLOPIDOGREL BISULFATE 75 MG/1
75 TABLET ORAL DAILY
Qty: 90 TABLET | Refills: 3 | Status: SHIPPED | OUTPATIENT
Start: 2018-05-18 | End: 2019-05-25 | Stop reason: SDUPTHER

## 2018-05-31 ENCOUNTER — OFFICE VISIT (OUTPATIENT)
Dept: OPHTHALMOLOGY | Age: 63
End: 2018-05-31
Payer: COMMERCIAL

## 2018-05-31 DIAGNOSIS — H46.9: Primary | ICD-10-CM

## 2018-05-31 DIAGNOSIS — H40.053 OCULAR HYPERTENSION, BILATERAL: ICD-10-CM

## 2018-05-31 DIAGNOSIS — Z96.1 PSEUDOPHAKIA OF BOTH EYES: ICD-10-CM

## 2018-05-31 PROCEDURE — 99214 OFFICE O/P EST MOD 30 MIN: CPT | Performed by: OPHTHALMOLOGY

## 2018-05-31 RX ORDER — BENOXINATE HCL/FLUORESCEIN SOD 0.4%-0.25%
1 DROPS OPHTHALMIC (EYE) ONCE
Status: COMPLETED | OUTPATIENT
Start: 2018-05-31 | End: 2018-05-31

## 2018-05-31 RX ORDER — TROPICAMIDE 10 MG/ML
1 SOLUTION/ DROPS OPHTHALMIC ONCE
Status: COMPLETED | OUTPATIENT
Start: 2018-05-31 | End: 2018-05-31

## 2018-05-31 RX ORDER — PHENYLEPHRINE HCL 2.5 %
1 DROPS OPHTHALMIC (EYE) ONCE
Status: COMPLETED | OUTPATIENT
Start: 2018-05-31 | End: 2018-05-31

## 2018-05-31 RX ADMIN — Medication 1 DROP: at 11:22

## 2018-05-31 RX ADMIN — TROPICAMIDE 1 DROP: 10 SOLUTION/ DROPS OPHTHALMIC at 11:22

## 2018-05-31 ASSESSMENT — REFRACTION_WEARINGRX
OD_AXIS: 060
OS_CYLINDER: -0.50
OS_AXIS: 120
OD_ADD: +2.75
OS_ADD: +2.75
OS_SPHERE: +0.75
OD_SPHERE: +0.25
OD_CYLINDER: -0.75

## 2018-05-31 ASSESSMENT — SLIT LAMP EXAM - LIDS
COMMENTS: MILD BLEPHARITIS. MILD MGD
COMMENTS: MILD BLEPHARITIS. MILD MGD

## 2018-05-31 ASSESSMENT — VISUAL ACUITY
CORRECTION_TYPE: GLASSES
METHOD: SNELLEN - LINEAR
OS_CC: 20/100

## 2018-05-31 ASSESSMENT — TONOMETRY
OD_IOP_MMHG: 17
OS_IOP_MMHG: 18
IOP_METHOD: TONOPEN

## 2018-09-20 ENCOUNTER — OFFICE VISIT (OUTPATIENT)
Dept: OPHTHALMOLOGY | Age: 63
End: 2018-09-20
Payer: COMMERCIAL

## 2018-09-20 DIAGNOSIS — H40.053 OCULAR HYPERTENSION, BILATERAL: Primary | ICD-10-CM

## 2018-09-20 DIAGNOSIS — Z96.1 PSEUDOPHAKIA OF BOTH EYES: ICD-10-CM

## 2018-09-20 DIAGNOSIS — H46.9: ICD-10-CM

## 2018-09-20 PROCEDURE — G8419 CALC BMI OUT NRM PARAM NOF/U: HCPCS | Performed by: OPHTHALMOLOGY

## 2018-09-20 PROCEDURE — 4004F PT TOBACCO SCREEN RCVD TLK: CPT | Performed by: OPHTHALMOLOGY

## 2018-09-20 PROCEDURE — 92250 FUNDUS PHOTOGRAPHY W/I&R: CPT | Performed by: OPHTHALMOLOGY

## 2018-09-20 PROCEDURE — 3017F COLORECTAL CA SCREEN DOC REV: CPT | Performed by: OPHTHALMOLOGY

## 2018-09-20 PROCEDURE — G8427 DOCREV CUR MEDS BY ELIG CLIN: HCPCS | Performed by: OPHTHALMOLOGY

## 2018-09-20 PROCEDURE — G8598 ASA/ANTIPLAT THER USED: HCPCS | Performed by: OPHTHALMOLOGY

## 2018-09-20 PROCEDURE — 99213 OFFICE O/P EST LOW 20 MIN: CPT | Performed by: OPHTHALMOLOGY

## 2018-09-20 ASSESSMENT — TONOMETRY
OS_IOP_MMHG: 11
OD_IOP_MMHG: 12
IOP_METHOD: NON-CONTACT AIR PUFF

## 2018-09-20 ASSESSMENT — VISUAL ACUITY
OS_PH_CC: 20/100
CORRECTION_TYPE: GLASSES
OS_CC: 20/100
METHOD: SNELLEN - LINEAR
OD_PH_CC: 20/200

## 2018-09-20 ASSESSMENT — SLIT LAMP EXAM - LIDS
COMMENTS: MILD BLEPHARITIS. MILD MGD
COMMENTS: MILD BLEPHARITIS. MILD MGD

## 2018-09-20 NOTE — PROGRESS NOTES
Haven Roberts is a 57 y.o. female here for a complete eye exam.      Chief Complaint   Patient presents with    Follow-up    Ophthalmic Testing       HPI     3 month follow up, fundus photos completed. OCT glaucoma to be completed at next dilated appointment. Timolol Maleate gel forming OU every AM, last drop this morning. ROS      Main Ophthalmology Exam     Slit Lamp Exam       Right Left    Lids/Lashes Mild Blepharitis. Mild MGD Mild Blepharitis. Mild MGD    Conjunctiva/Sclera Clear and White Clear and White    Cornea Clear Clear    Anterior Chamber Deep; No Cells, No Flare Deep; No Cells, No Flare    Iris Round and Reactive Round and Reactive    Lens Pseudophakia Pseudophakia                   Tonometry     Tonometry (Non-contact air puff, 2:32 PM)       Right Left    Pressure 12 11              Visual Acuity     Visual Acuity (Snellen - Linear)       Right Left    Dist cc 20/200 20/100    Dist ph cc 20/200 20/100    Correction:  Glasses               Not recorded          Not recorded          Not recorded         Not recorded          Past Medical History:   Diagnosis Date    Bilateral pseudophakia     Condyloma acuminata     history of. .burned off    Multiple sclerosis ([facility] [address]     Opaque posterior capsule     (Left eye worse than right).  Open-angle glaucoma     Optic atrophy     (right eye).     Optic neuritis     since 06/1996          Current Outpatient Prescriptions:     losartan (COZAAR) 25 MG tablet, Take 25 mg by mouth daily, Disp: , Rfl:     rosuvastatin (CRESTOR) 20 MG tablet, Take 1 tablet by mouth nightly, Disp: 90 tablet, Rfl: 3    aspirin 81 MG chewable tablet, Take 1 tablet by mouth daily, Disp: 90 tablet, Rfl: 3    clopidogrel (PLAVIX) 75 MG tablet, Take 1 tablet by mouth daily, Disp: 90 tablet, Rfl: 3    oxyCODONE-acetaminophen (PERCOCET) 5-325 MG per tablet, Take 1 tablet by mouth every 4 hours as needed for Pain., Disp: , Rfl:     tiZANidine (ZANAFLEX) 4 MG tablet,

## 2018-09-24 LAB
CHOLESTEROL, TOTAL: 118 MG/DL
CHOLESTEROL/HDL RATIO: 2.4
HDLC SERPL-MCNC: 49 MG/DL (ref 35–70)
LDL CHOLESTEROL CALCULATED: 38.6 MG/DL (ref 0–160)
TRIGL SERPL-MCNC: 152 MG/DL
VLDLC SERPL CALC-MCNC: 30 MG/DL

## 2018-09-27 ENCOUNTER — TELEPHONE (OUTPATIENT)
Dept: OPHTHALMOLOGY | Age: 63
End: 2018-09-27

## 2018-09-27 ENCOUNTER — TELEPHONE (OUTPATIENT)
Dept: CARDIOLOGY CLINIC | Age: 63
End: 2018-09-27

## 2018-09-27 DIAGNOSIS — I25.10 CORONARY ARTERY DISEASE INVOLVING NATIVE CORONARY ARTERY OF NATIVE HEART WITHOUT ANGINA PECTORIS: ICD-10-CM

## 2018-09-27 DIAGNOSIS — E78.5 HYPERLIPIDEMIA LDL GOAL <70: ICD-10-CM

## 2018-09-27 RX ORDER — TIMOLOL MALEATE 5 MG/ML
1 SOLUTION OPHTHALMIC DAILY
Qty: 1 BOTTLE | Refills: 11 | Status: SHIPPED | OUTPATIENT
Start: 2018-09-27 | End: 2018-10-05 | Stop reason: ALTCHOICE

## 2018-10-05 ENCOUNTER — OFFICE VISIT (OUTPATIENT)
Dept: CARDIOLOGY CLINIC | Age: 63
End: 2018-10-05
Payer: COMMERCIAL

## 2018-10-05 VITALS
BODY MASS INDEX: 27 KG/M2 | WEIGHT: 172 LBS | HEIGHT: 67 IN | DIASTOLIC BLOOD PRESSURE: 84 MMHG | HEART RATE: 68 BPM | SYSTOLIC BLOOD PRESSURE: 136 MMHG

## 2018-10-05 DIAGNOSIS — Z95.5 S/P PRIMARY ANGIOPLASTY WITH CORONARY STENT: ICD-10-CM

## 2018-10-05 DIAGNOSIS — I25.10 CORONARY ARTERY DISEASE INVOLVING NATIVE CORONARY ARTERY OF NATIVE HEART WITHOUT ANGINA PECTORIS: ICD-10-CM

## 2018-10-05 DIAGNOSIS — E78.2 MIXED HYPERLIPIDEMIA: ICD-10-CM

## 2018-10-05 DIAGNOSIS — Z72.0 TOBACCO ABUSE: ICD-10-CM

## 2018-10-05 DIAGNOSIS — Z96.1 PSEUDOPHAKIA OF BOTH EYES: ICD-10-CM

## 2018-10-05 DIAGNOSIS — I21.11 ST ELEVATION MYOCARDIAL INFARCTION INVOLVING RIGHT CORONARY ARTERY (HCC): Primary | ICD-10-CM

## 2018-10-05 PROCEDURE — 99213 OFFICE O/P EST LOW 20 MIN: CPT | Performed by: INTERNAL MEDICINE

## 2018-10-05 RX ORDER — LOSARTAN POTASSIUM 25 MG/1
25 TABLET ORAL DAILY
Qty: 90 TABLET | Refills: 3 | Status: SHIPPED | OUTPATIENT
Start: 2018-10-05 | End: 2019-09-21 | Stop reason: SDUPTHER

## 2018-12-12 ENCOUNTER — PROCEDURE VISIT (OUTPATIENT)
Dept: OPHTHALMOLOGY | Age: 63
End: 2018-12-12
Payer: MEDICARE

## 2018-12-12 DIAGNOSIS — H40.053 OCULAR HYPERTENSION, BILATERAL: Primary | ICD-10-CM

## 2018-12-12 PROCEDURE — 1036F TOBACCO NON-USER: CPT | Performed by: OPHTHALMOLOGY

## 2018-12-12 PROCEDURE — 4040F PNEUMOC VAC/ADMIN/RCVD: CPT | Performed by: OPHTHALMOLOGY

## 2018-12-12 PROCEDURE — 1123F ACP DISCUSS/DSCN MKR DOCD: CPT | Performed by: OPHTHALMOLOGY

## 2018-12-12 PROCEDURE — 3017F COLORECTAL CA SCREEN DOC REV: CPT | Performed by: OPHTHALMOLOGY

## 2018-12-12 PROCEDURE — G8484 FLU IMMUNIZE NO ADMIN: HCPCS | Performed by: OPHTHALMOLOGY

## 2018-12-12 PROCEDURE — G8400 PT W/DXA NO RESULTS DOC: HCPCS | Performed by: OPHTHALMOLOGY

## 2018-12-12 PROCEDURE — G8598 ASA/ANTIPLAT THER USED: HCPCS | Performed by: OPHTHALMOLOGY

## 2018-12-12 PROCEDURE — 1090F PRES/ABSN URINE INCON ASSESS: CPT | Performed by: OPHTHALMOLOGY

## 2018-12-12 PROCEDURE — 65855 TRABECULOPLASTY LASER SURG: CPT | Performed by: OPHTHALMOLOGY

## 2018-12-12 PROCEDURE — G8419 CALC BMI OUT NRM PARAM NOF/U: HCPCS | Performed by: OPHTHALMOLOGY

## 2018-12-12 PROCEDURE — 1101F PT FALLS ASSESS-DOCD LE1/YR: CPT | Performed by: OPHTHALMOLOGY

## 2018-12-12 PROCEDURE — G8427 DOCREV CUR MEDS BY ELIG CLIN: HCPCS | Performed by: OPHTHALMOLOGY

## 2018-12-12 PROCEDURE — 99213 OFFICE O/P EST LOW 20 MIN: CPT | Performed by: OPHTHALMOLOGY

## 2018-12-12 RX ORDER — PILOCARPINE HYDROCHLORIDE 20 MG/ML
1 SOLUTION/ DROPS OPHTHALMIC ONCE
Status: COMPLETED | OUTPATIENT
Start: 2018-12-12 | End: 2018-12-12

## 2018-12-12 RX ORDER — BRIMONIDINE TARTRATE 2 MG/ML
1 SOLUTION/ DROPS OPHTHALMIC ONCE
Status: COMPLETED | OUTPATIENT
Start: 2018-12-12 | End: 2018-12-12

## 2018-12-12 RX ORDER — TETRACAINE HYDROCHLORIDE 5 MG/ML
1 SOLUTION OPHTHALMIC ONCE
Status: COMPLETED | OUTPATIENT
Start: 2018-12-12 | End: 2018-12-12

## 2018-12-12 RX ORDER — PREDNISOLONE ACETATE 10 MG/ML
1 SUSPENSION/ DROPS OPHTHALMIC 3 TIMES DAILY
Qty: 1 BOTTLE | Refills: 0 | Status: SHIPPED | OUTPATIENT
Start: 2018-12-12 | End: 2018-12-17

## 2018-12-12 RX ORDER — PREDNISOLONE ACETATE 10 MG/ML
1 SUSPENSION/ DROPS OPHTHALMIC ONCE
Status: COMPLETED | OUTPATIENT
Start: 2018-12-12 | End: 2018-12-12

## 2018-12-12 RX ADMIN — TETRACAINE HYDROCHLORIDE 1 DROP: 5 SOLUTION OPHTHALMIC at 14:19

## 2018-12-12 RX ADMIN — PREDNISOLONE ACETATE 1 DROP: 10 SUSPENSION/ DROPS OPHTHALMIC at 14:19

## 2018-12-12 RX ADMIN — BRIMONIDINE TARTRATE 1 DROP: 2 SOLUTION/ DROPS OPHTHALMIC at 14:18

## 2018-12-12 RX ADMIN — PILOCARPINE HYDROCHLORIDE 1 DROP: 20 SOLUTION/ DROPS OPHTHALMIC at 14:19

## 2018-12-12 ASSESSMENT — SLIT LAMP EXAM - LIDS
COMMENTS: MILD BLEPHARITIS. MILD MGD
COMMENTS: MILD BLEPHARITIS. MILD MGD

## 2018-12-12 ASSESSMENT — VISUAL ACUITY
OS_PH_CC: 20/100
CORRECTION_TYPE: GLASSES
OS_CC: 20/100
OD_PH_CC: 20/200
METHOD: SNELLEN - LINEAR

## 2018-12-12 ASSESSMENT — TONOMETRY
OD_IOP_MMHG: 17
OS_IOP_MMHG: 11
IOP_METHOD: NON-CONTACT AIR PUFF

## 2018-12-14 ENCOUNTER — OFFICE VISIT (OUTPATIENT)
Dept: PRIMARY CARE CLINIC | Age: 63
End: 2018-12-14
Payer: MEDICARE

## 2018-12-14 ENCOUNTER — PROCEDURE VISIT (OUTPATIENT)
Dept: OPHTHALMOLOGY | Age: 63
End: 2018-12-14
Payer: MEDICARE

## 2018-12-14 VITALS
HEART RATE: 68 BPM | TEMPERATURE: 98.2 F | DIASTOLIC BLOOD PRESSURE: 68 MMHG | SYSTOLIC BLOOD PRESSURE: 108 MMHG | OXYGEN SATURATION: 96 %

## 2018-12-14 DIAGNOSIS — H40.053 OCULAR HYPERTENSION, BILATERAL: Primary | ICD-10-CM

## 2018-12-14 DIAGNOSIS — R55 VASOVAGAL SYNCOPE: Primary | ICD-10-CM

## 2018-12-14 PROCEDURE — 1101F PT FALLS ASSESS-DOCD LE1/YR: CPT | Performed by: FAMILY MEDICINE

## 2018-12-14 PROCEDURE — 1036F TOBACCO NON-USER: CPT | Performed by: FAMILY MEDICINE

## 2018-12-14 PROCEDURE — G8598 ASA/ANTIPLAT THER USED: HCPCS | Performed by: OPHTHALMOLOGY

## 2018-12-14 PROCEDURE — G8598 ASA/ANTIPLAT THER USED: HCPCS | Performed by: FAMILY MEDICINE

## 2018-12-14 PROCEDURE — 4040F PNEUMOC VAC/ADMIN/RCVD: CPT | Performed by: FAMILY MEDICINE

## 2018-12-14 PROCEDURE — G8484 FLU IMMUNIZE NO ADMIN: HCPCS | Performed by: FAMILY MEDICINE

## 2018-12-14 PROCEDURE — G8400 PT W/DXA NO RESULTS DOC: HCPCS | Performed by: OPHTHALMOLOGY

## 2018-12-14 PROCEDURE — 99203 OFFICE O/P NEW LOW 30 MIN: CPT | Performed by: FAMILY MEDICINE

## 2018-12-14 PROCEDURE — 99213 OFFICE O/P EST LOW 20 MIN: CPT | Performed by: OPHTHALMOLOGY

## 2018-12-14 PROCEDURE — 1090F PRES/ABSN URINE INCON ASSESS: CPT | Performed by: OPHTHALMOLOGY

## 2018-12-14 PROCEDURE — 1090F PRES/ABSN URINE INCON ASSESS: CPT | Performed by: FAMILY MEDICINE

## 2018-12-14 PROCEDURE — 1101F PT FALLS ASSESS-DOCD LE1/YR: CPT | Performed by: OPHTHALMOLOGY

## 2018-12-14 PROCEDURE — 3017F COLORECTAL CA SCREEN DOC REV: CPT | Performed by: FAMILY MEDICINE

## 2018-12-14 PROCEDURE — G8427 DOCREV CUR MEDS BY ELIG CLIN: HCPCS | Performed by: FAMILY MEDICINE

## 2018-12-14 PROCEDURE — G8427 DOCREV CUR MEDS BY ELIG CLIN: HCPCS | Performed by: OPHTHALMOLOGY

## 2018-12-14 PROCEDURE — 1036F TOBACCO NON-USER: CPT | Performed by: OPHTHALMOLOGY

## 2018-12-14 PROCEDURE — G8419 CALC BMI OUT NRM PARAM NOF/U: HCPCS | Performed by: FAMILY MEDICINE

## 2018-12-14 PROCEDURE — G8484 FLU IMMUNIZE NO ADMIN: HCPCS | Performed by: OPHTHALMOLOGY

## 2018-12-14 PROCEDURE — 1123F ACP DISCUSS/DSCN MKR DOCD: CPT | Performed by: FAMILY MEDICINE

## 2018-12-14 PROCEDURE — 4040F PNEUMOC VAC/ADMIN/RCVD: CPT | Performed by: OPHTHALMOLOGY

## 2018-12-14 PROCEDURE — 1123F ACP DISCUSS/DSCN MKR DOCD: CPT | Performed by: OPHTHALMOLOGY

## 2018-12-14 PROCEDURE — G8400 PT W/DXA NO RESULTS DOC: HCPCS | Performed by: FAMILY MEDICINE

## 2018-12-14 PROCEDURE — 3017F COLORECTAL CA SCREEN DOC REV: CPT | Performed by: OPHTHALMOLOGY

## 2018-12-14 PROCEDURE — G8419 CALC BMI OUT NRM PARAM NOF/U: HCPCS | Performed by: OPHTHALMOLOGY

## 2018-12-14 RX ORDER — PREDNISOLONE ACETATE 10 MG/ML
1 SUSPENSION/ DROPS OPHTHALMIC ONCE
Status: COMPLETED | OUTPATIENT
Start: 2018-12-14 | End: 2018-12-14

## 2018-12-14 RX ORDER — BRIMONIDINE TARTRATE 2 MG/ML
1 SOLUTION/ DROPS OPHTHALMIC ONCE
Status: COMPLETED | OUTPATIENT
Start: 2018-12-14 | End: 2018-12-14

## 2018-12-14 RX ORDER — TETRACAINE HYDROCHLORIDE 5 MG/ML
1 SOLUTION OPHTHALMIC ONCE
Status: COMPLETED | OUTPATIENT
Start: 2018-12-14 | End: 2018-12-14

## 2018-12-14 RX ORDER — PILOCARPINE HYDROCHLORIDE 20 MG/ML
1 SOLUTION/ DROPS OPHTHALMIC ONCE
Status: COMPLETED | OUTPATIENT
Start: 2018-12-14 | End: 2018-12-14

## 2018-12-14 RX ADMIN — TETRACAINE HYDROCHLORIDE 1 DROP: 5 SOLUTION OPHTHALMIC at 16:24

## 2018-12-14 RX ADMIN — PILOCARPINE HYDROCHLORIDE 1 DROP: 20 SOLUTION/ DROPS OPHTHALMIC at 16:23

## 2018-12-14 RX ADMIN — BRIMONIDINE TARTRATE 1 DROP: 2 SOLUTION/ DROPS OPHTHALMIC at 16:23

## 2018-12-14 RX ADMIN — PREDNISOLONE ACETATE 1 DROP: 10 SUSPENSION/ DROPS OPHTHALMIC at 16:23

## 2018-12-14 ASSESSMENT — VISUAL ACUITY
CORRECTION_TYPE: GLASSES
OS_CC: 20/100
METHOD: SNELLEN - LINEAR

## 2018-12-14 ASSESSMENT — TONOMETRY
OD_IOP_MMHG: 15
OS_IOP_MMHG: 11
IOP_METHOD: NON-CONTACT AIR PUFF

## 2018-12-14 ASSESSMENT — SLIT LAMP EXAM - LIDS
COMMENTS: MILD BLEPHARITIS. MILD MGD
COMMENTS: MILD BLEPHARITIS. MILD MGD

## 2018-12-14 NOTE — PROGRESS NOTES
Oc Gordon jeniffer 70 y.o. female here for a complete eye exam.      No chief complaint on file. HPI     Patient is here for a MLT in left eye, patient had an MLT on right eye on 12/12/18 , patient states she isnt having any problems. MLT drops placed in patients left eye. Review of Systems      Main Ophthalmology Exam     Slit Lamp Exam       Right Left    Lids/Lashes Mild Blepharitis. Mild MGD Mild Blepharitis. Mild MGD    Conjunctiva/Sclera Clear and White Clear and White    Cornea Clear Clear    Anterior Chamber Deep; No Cells, No Flare Deep; No Cells, No Flare    Iris Round and Reactive Round and Reactive    Lens Pseudophakia Pseudophakia                   Tonometry     Tonometry (Non-contact air puff, 1:40 PM)       Right Left    Pressure 15 11              Visual Acuity     Visual Acuity (Snellen - Linear)       Right Left    Dist cc 20/200 20/100    Correction:  Glasses               Not recorded          Not recorded         Extraocular Movement     Extraocular Movement       Right Left     Full, Ortho Full, Ortho               Not recorded          Past Medical History:   Diagnosis Date    Bilateral pseudophakia     Condyloma acuminata     history of. .burned off    Multiple sclerosis ([address]     Opaque posterior capsule     (Left eye worse than right).  Open-angle glaucoma     Optic atrophy     (right eye).     Optic neuritis     since 06/1996          Current Outpatient Prescriptions:     prednisoLONE acetate (PRED FORTE) 1 % ophthalmic suspension, Place 1 drop into the right eye 3 times daily for 5 days, Disp: 1 Bottle, Rfl: 0    losartan (COZAAR) 25 MG tablet, Take 1 tablet by mouth daily, Disp: 90 tablet, Rfl: 3    rosuvastatin (CRESTOR) 20 MG tablet, Take 1 tablet by mouth nightly, Disp: 90 tablet, Rfl: 3    aspirin 81 MG chewable tablet, Take 1 tablet by mouth daily, Disp: 90 tablet, Rfl: 3    clopidogrel (PLAVIX) 75 MG tablet, Take 1 tablet by mouth daily, Disp: 90 tablet, Rfl: 3    oxyCODONE-acetaminophen (PERCOCET) 5-325 MG per tablet, Take 1 tablet by mouth every 4 hours as needed for Pain., Disp: , Rfl:     tiZANidine (ZANAFLEX) 4 MG tablet, Take 4 mg by mouth every 6 hours as needed, Disp: , Rfl:     cyanocobalamin 1000 MCG/ML injection, Inject 1,000 mcg into the muscle every 30 days, Disp: , Rfl:     Cholecalciferol (VITAMIN D) 2000 units CAPS capsule, Take 4,000 Units by mouth daily , Disp: , Rfl:     timolol (TIMOPTIC-XE) 0.5 % ophthalmic gel-forming, INSTILL 1 DROP INTO BOTH EYES ONCE DAILY, Disp: 5 mL, Rfl: 11    sertraline (ZOLOFT) 25 MG tablet, Take 25 mg by mouth daily , Disp: , Rfl:     BETASERON 0.3 MG injection, , Disp: , Rfl:      Allergies   Allergen Reactions    Ciprofloxacin Hcl Hives    Lipitor [Atorvastatin] Other (See Comments)     Muscle cramps/ trouble walking    Lisinopril Other (See Comments)     cough    Contrast [Iodides] Hives, Nausea And Vomiting and Other (See Comments)     syncope        IMPRESSION:  1. Ocular hypertension, bilateral        PLAN:    1. Ocular hypertension, bilateral    Pt had successful MLT OD 12 Dec 2018. Pt stated  that she had headache on that date after instilliation  of Pilocarpine. Pt presented for MLT OS today. Pt stated significant  headache, nausea, and dizziness after instillation of  Pilocarpine OS today. Pt appeared mildly diaphoretic. MLT OS was cancelled today. Pt was [facility] to the [facility]  for additional evaluation by Dr. Alfred Faria.     Electronically signed by Darrick Gutiérrez MD on 12/14/2018 at 2:18 PM

## 2018-12-16 ASSESSMENT — ENCOUNTER SYMPTOMS
SHORTNESS OF BREATH: 0
WHEEZING: 0
EYE PAIN: 0
COUGH: 0
EYE REDNESS: 0
CHEST TIGHTNESS: 0

## 2019-04-12 ENCOUNTER — OFFICE VISIT (OUTPATIENT)
Dept: CARDIOLOGY CLINIC | Age: 64
End: 2019-04-12
Payer: MEDICARE

## 2019-04-12 VITALS
BODY MASS INDEX: 27.1 KG/M2 | SYSTOLIC BLOOD PRESSURE: 122 MMHG | HEART RATE: 59 BPM | DIASTOLIC BLOOD PRESSURE: 70 MMHG | WEIGHT: 173 LBS

## 2019-04-12 DIAGNOSIS — E78.2 MIXED HYPERLIPIDEMIA: ICD-10-CM

## 2019-04-12 DIAGNOSIS — Z95.5 S/P CORONARY ARTERY STENT PLACEMENT: ICD-10-CM

## 2019-04-12 DIAGNOSIS — R06.02 SHORTNESS OF BREATH: ICD-10-CM

## 2019-04-12 DIAGNOSIS — I21.11 ST ELEVATION MYOCARDIAL INFARCTION INVOLVING RIGHT CORONARY ARTERY (HCC): Primary | ICD-10-CM

## 2019-04-12 DIAGNOSIS — I10 ESSENTIAL HYPERTENSION: ICD-10-CM

## 2019-04-12 DIAGNOSIS — I25.10 CORONARY ARTERY DISEASE INVOLVING NATIVE CORONARY ARTERY OF NATIVE HEART WITHOUT ANGINA PECTORIS: ICD-10-CM

## 2019-04-12 PROCEDURE — G8427 DOCREV CUR MEDS BY ELIG CLIN: HCPCS | Performed by: INTERNAL MEDICINE

## 2019-04-12 PROCEDURE — 4040F PNEUMOC VAC/ADMIN/RCVD: CPT | Performed by: INTERNAL MEDICINE

## 2019-04-12 PROCEDURE — 1123F ACP DISCUSS/DSCN MKR DOCD: CPT | Performed by: INTERNAL MEDICINE

## 2019-04-12 PROCEDURE — 99213 OFFICE O/P EST LOW 20 MIN: CPT | Performed by: INTERNAL MEDICINE

## 2019-04-12 PROCEDURE — 1036F TOBACCO NON-USER: CPT | Performed by: INTERNAL MEDICINE

## 2019-04-12 PROCEDURE — 3017F COLORECTAL CA SCREEN DOC REV: CPT | Performed by: INTERNAL MEDICINE

## 2019-04-12 PROCEDURE — G8400 PT W/DXA NO RESULTS DOC: HCPCS | Performed by: INTERNAL MEDICINE

## 2019-04-12 PROCEDURE — 1090F PRES/ABSN URINE INCON ASSESS: CPT | Performed by: INTERNAL MEDICINE

## 2019-04-12 PROCEDURE — G8419 CALC BMI OUT NRM PARAM NOF/U: HCPCS | Performed by: INTERNAL MEDICINE

## 2019-04-12 PROCEDURE — G8598 ASA/ANTIPLAT THER USED: HCPCS | Performed by: INTERNAL MEDICINE

## 2019-04-12 NOTE — PROGRESS NOTES
Cardiology Consultation  [facility] [place], Keesha Miller    04/12/19    Patient is here for cad, post STEMI and Stenting    HPI and Chief Complaint:  Stu Back  is doing well from a cardiac standpoint. Good functional capacity with no significant change in functional capacity. No chest pain, no dyspnea, no PND, no syncope or pre-syncope, no orthopnea. No symptoms of CHF or angina/chest pain. REVIEW OF SYSTEMS:    · Constitutional: there has been no unanticipated weight loss. There's been No change in energy level, No change in activity level. · Eyes: No visual changes or diplopia. No scleral icterus. · ENT: No Headaches, hearing loss or vertigo. No mouth sores or sore throat. · Cardiovascular: No chest pain, no dyspnea, no chf like symptoms  · Respiratory: No previous pulmonary problems  · Gastrointestinal: No abdominal pain, appetite loss, blood in stools. No change in bowel or bladder habits. · Genitourinary: No dysuria, trouble voiding, or hematuria. · Musculoskeletal:  No gait disturbance, No weakness or joint complaints. · Integumentary: No rash or pruritis. · Neurological: No headache, diplopia, change in muscle strength, numbness or tingling. No change in gait, balance, coordination, mood, affect, memory, mentation, behavior. · Psychiatric: No new anxiety or depression. · Endocrine: No temperature intolerance. No excessive thirst, fluid intake, or urination. No tremor. · Hematologic/Lymphatic: No abnormal bruising or bleeding, blood clots or swollen lymph nodes. · Allergic/Immunologic: No nasal congestion or hives. Physical Exam:   Vitals: /70   Pulse 59   Wt 173 lb (78.5 kg)   BMI 27.10 kg/m²     General appearance: alert and cooperative with exam  HEENT: Head: Normocephalic, no lesions, without obvious abnormality.   Neck: no carotid bruit, no JVD  Lungs: clear to auscultation bilaterally  Heart: regular rate and rhythm, S1, S2 normal, no

## 2019-04-18 ENCOUNTER — OFFICE VISIT (OUTPATIENT)
Dept: OPHTHALMOLOGY | Age: 64
End: 2019-04-18
Payer: MEDICARE

## 2019-04-18 DIAGNOSIS — H40.9 MODERATE STAGE GLAUCOMA: Primary | ICD-10-CM

## 2019-04-18 PROCEDURE — 92133 CPTRZD OPH DX IMG PST SGM ON: CPT | Performed by: OPHTHALMOLOGY

## 2019-04-18 PROCEDURE — 92083 EXTENDED VISUAL FIELD XM: CPT | Performed by: OPHTHALMOLOGY

## 2019-04-18 NOTE — PROGRESS NOTES
Conrado Berry presents today for Ophthalmic Testing  .     HPI     Patient is here for a visual field 24-2 and OCT glaucoma /pachymetry,

## 2019-04-24 ENCOUNTER — OFFICE VISIT (OUTPATIENT)
Dept: OPHTHALMOLOGY | Age: 64
End: 2019-04-24
Payer: MEDICARE

## 2019-04-24 DIAGNOSIS — H47.20 OPTIC ATROPHY: Primary | ICD-10-CM

## 2019-04-24 DIAGNOSIS — H40.053 OCULAR HYPERTENSION, BILATERAL: ICD-10-CM

## 2019-04-24 PROCEDURE — G8419 CALC BMI OUT NRM PARAM NOF/U: HCPCS | Performed by: OPHTHALMOLOGY

## 2019-04-24 PROCEDURE — 1090F PRES/ABSN URINE INCON ASSESS: CPT | Performed by: OPHTHALMOLOGY

## 2019-04-24 PROCEDURE — G8427 DOCREV CUR MEDS BY ELIG CLIN: HCPCS | Performed by: OPHTHALMOLOGY

## 2019-04-24 PROCEDURE — 4040F PNEUMOC VAC/ADMIN/RCVD: CPT | Performed by: OPHTHALMOLOGY

## 2019-04-24 PROCEDURE — G8400 PT W/DXA NO RESULTS DOC: HCPCS | Performed by: OPHTHALMOLOGY

## 2019-04-24 PROCEDURE — 1036F TOBACCO NON-USER: CPT | Performed by: OPHTHALMOLOGY

## 2019-04-24 PROCEDURE — G8598 ASA/ANTIPLAT THER USED: HCPCS | Performed by: OPHTHALMOLOGY

## 2019-04-24 PROCEDURE — 99213 OFFICE O/P EST LOW 20 MIN: CPT | Performed by: OPHTHALMOLOGY

## 2019-04-24 PROCEDURE — 3017F COLORECTAL CA SCREEN DOC REV: CPT | Performed by: OPHTHALMOLOGY

## 2019-04-24 PROCEDURE — 1123F ACP DISCUSS/DSCN MKR DOCD: CPT | Performed by: OPHTHALMOLOGY

## 2019-04-24 ASSESSMENT — VISUAL ACUITY
METHOD: SNELLEN - LINEAR
OD_PH_CC: 20/200
CORRECTION_TYPE: GLASSES
OS_CC: 20/100
OS_PH_CC: 20/100

## 2019-04-24 ASSESSMENT — TONOMETRY
IOP_METHOD: NON-CONTACT AIR PUFF
OS_IOP_MMHG: 10
OD_IOP_MMHG: 23

## 2019-04-24 ASSESSMENT — SLIT LAMP EXAM - LIDS
COMMENTS: MILD BLEPHARITIS. MILD MGD
COMMENTS: MILD BLEPHARITIS. MILD MGD

## 2019-04-24 NOTE — PROGRESS NOTES
Janet Belle jeniffer 70 y.o. female here for a complete eye exam.      Chief Complaint   Patient presents with    2 Week Follow-Up       HPI     Pt is here for follow up [facility] visual field [facility] [place]  04/18/[facility]. Pt has no current complaints. Review of Systems      Main Ophthalmology Exam     Slit Lamp Exam       Right Left    Lids/Lashes Mild Blepharitis. Mild MGD Mild Blepharitis. Mild MGD    Conjunctiva/Sclera Clear and White Clear and White    Cornea Clear Clear    Anterior Chamber Deep; No Cells, No Flare Deep; No Cells, No Flare    Iris Round and Reactive Round and Reactive    Lens Pseudophakia Pseudophakia                   Tonometry     Tonometry (Non-contact air puff, 2:34 PM)       Right Left    Pressure 23 10              Visual Acuity     Visual Acuity (Snellen - Linear)       Right Left    Dist cc 20/200 20/100    Dist ph cc 20/200 20/100    Correction:  Glasses               Not recorded          Not recorded         Extraocular Movement     Extraocular Movement       Right Left     Full, Ortho Full, Ortho               Not recorded          Past Medical History:   Diagnosis Date    Bilateral pseudophakia     Condyloma acuminata     history of. .burned off    Multiple sclerosis ([address]     Opaque posterior capsule     (Left eye worse than right).  Open-angle glaucoma     Optic atrophy     (right eye).     Optic neuritis     since 06/1996          Current Outpatient Medications:     losartan (COZAAR) 25 MG tablet, Take 1 tablet by mouth daily, Disp: 90 tablet, Rfl: 3    rosuvastatin (CRESTOR) 20 MG tablet, Take 1 tablet by mouth nightly, Disp: 90 tablet, Rfl: 3    aspirin 81 MG chewable tablet, Take 1 tablet by mouth daily, Disp: 90 tablet, Rfl: 3    clopidogrel (PLAVIX) 75 MG tablet, Take 1 tablet by mouth daily, Disp: 90 tablet, Rfl: 3    oxyCODONE-acetaminophen (PERCOCET) 5-325 MG per tablet, Take 1 tablet by mouth every 4 hours as needed for Pain., Disp: , Rfl:     tiZANidine (ZANAFLEX) 4 MG tablet, Take 4 mg by mouth every 6 hours as needed, Disp: , Rfl:     cyanocobalamin 1000 MCG/ML injection, Inject 1,000 mcg into the muscle every 30 days, Disp: , Rfl:     Cholecalciferol (VITAMIN D) 2000 units CAPS capsule, Take 4,000 Units by mouth daily , Disp: , Rfl:     timolol (TIMOPTIC-XE) 0.5 % ophthalmic gel-forming, INSTILL 1 DROP INTO BOTH EYES ONCE DAILY, Disp: 5 mL, Rfl: 11    sertraline (ZOLOFT) 25 MG tablet, Take 25 mg by mouth daily , Disp: , Rfl:     BETASERON 0.3 MG injection, , Disp: , Rfl:      Allergies   Allergen Reactions    Ciprofloxacin Hcl Hives    Lipitor [Atorvastatin] Other (See Comments)     Muscle cramps/ trouble walking    Lisinopril Other (See Comments)     cough    Contrast [Iodides] Hives, Nausea And Vomiting and Other (See Comments)     syncope        IMPRESSION:  1. Optic atrophy    2. Ocular hypertension, bilateral        PLAN:    1. Optic atrophy    Reviewed recent HVF OU with pt. Pt has Central Scotomas with no   definite Nasal Steps or [place] Scotomas. Counseled pt that visual field defects and  abnormal OCT RNFL may be explained by  previous episodes of Optic Neuritis secondary  to MS, however, it is still prudent to continue  Timoptic Gel OU QAM.     2. Ocular hypertension, bilateral    Pt had MLT OD in Dec 2018. IOP OD is elevated after pt has not  taken Timoptic for a few weeks. Advised pt that MLT OD does not appear  to have been effective and to use the  Timoptic Gel OU QAM.    Follow.     Electronically signed by Katrin Nassar MD on 4/24/2019 at 2:46 PM

## 2019-05-13 RX ORDER — ROSUVASTATIN CALCIUM 20 MG/1
20 TABLET, COATED ORAL NIGHTLY
Qty: 90 TABLET | Refills: 3 | Status: SHIPPED | OUTPATIENT
Start: 2019-05-13 | End: 2020-02-18

## 2019-05-28 RX ORDER — CLOPIDOGREL BISULFATE 75 MG/1
TABLET ORAL
Qty: 90 TABLET | Refills: 3 | Status: SHIPPED | OUTPATIENT
Start: 2019-05-28 | End: 2020-02-17 | Stop reason: SDUPTHER

## 2019-05-28 RX ORDER — ROSUVASTATIN CALCIUM 20 MG/1
20 TABLET, COATED ORAL NIGHTLY
Qty: 90 TABLET | Refills: 3 | Status: SHIPPED | OUTPATIENT
Start: 2019-05-28 | End: 2019-10-11 | Stop reason: CLARIF

## 2019-06-05 RX ORDER — CLOPIDOGREL BISULFATE 75 MG/1
TABLET ORAL
Qty: 90 TABLET | Refills: 3 | Status: SHIPPED | OUTPATIENT
Start: 2019-06-05 | End: 2019-10-11 | Stop reason: CLARIF

## 2019-06-24 ENCOUNTER — OFFICE VISIT (OUTPATIENT)
Dept: OPHTHALMOLOGY | Age: 64
End: 2019-06-24
Payer: MEDICARE

## 2019-06-24 DIAGNOSIS — H47.20 OPTIC ATROPHY: ICD-10-CM

## 2019-06-24 DIAGNOSIS — H46.9: Primary | ICD-10-CM

## 2019-06-24 DIAGNOSIS — H40.053 OCULAR HYPERTENSION, BILATERAL: ICD-10-CM

## 2019-06-24 PROCEDURE — 1036F TOBACCO NON-USER: CPT | Performed by: OPHTHALMOLOGY

## 2019-06-24 PROCEDURE — 3017F COLORECTAL CA SCREEN DOC REV: CPT | Performed by: OPHTHALMOLOGY

## 2019-06-24 PROCEDURE — 4040F PNEUMOC VAC/ADMIN/RCVD: CPT | Performed by: OPHTHALMOLOGY

## 2019-06-24 PROCEDURE — 1123F ACP DISCUSS/DSCN MKR DOCD: CPT | Performed by: OPHTHALMOLOGY

## 2019-06-24 PROCEDURE — G8427 DOCREV CUR MEDS BY ELIG CLIN: HCPCS | Performed by: OPHTHALMOLOGY

## 2019-06-24 PROCEDURE — G8419 CALC BMI OUT NRM PARAM NOF/U: HCPCS | Performed by: OPHTHALMOLOGY

## 2019-06-24 PROCEDURE — G8598 ASA/ANTIPLAT THER USED: HCPCS | Performed by: OPHTHALMOLOGY

## 2019-06-24 PROCEDURE — 1090F PRES/ABSN URINE INCON ASSESS: CPT | Performed by: OPHTHALMOLOGY

## 2019-06-24 PROCEDURE — G8400 PT W/DXA NO RESULTS DOC: HCPCS | Performed by: OPHTHALMOLOGY

## 2019-06-24 PROCEDURE — 99213 OFFICE O/P EST LOW 20 MIN: CPT | Performed by: OPHTHALMOLOGY

## 2019-06-24 ASSESSMENT — TONOMETRY
IOP_METHOD: NON-CONTACT AIR PUFF
OS_IOP_MMHG: 12
OD_IOP_MMHG: 17

## 2019-06-24 ASSESSMENT — VISUAL ACUITY
METHOD: SNELLEN - LINEAR
OD_PH_CC: 20/200
OS_PH_CC: 20/300
OS_CC: 20/300
CORRECTION_TYPE: GLASSES

## 2019-06-24 ASSESSMENT — SLIT LAMP EXAM - LIDS
COMMENTS: MILD BLEPHARITIS. MILD MGD
COMMENTS: MILD BLEPHARITIS. MILD MGD

## 2019-06-24 NOTE — PROGRESS NOTES
Roselia Phillips jeniffer 70 y.o. female here for a complete eye exam.      Chief Complaint   Patient presents with    Glaucoma       HPI     Patient is here for a 2 month follow up from 04/24/19 for glaucoma and optic atrophy, patient had an MLT done in right eye in 12/18 and she is on Timoptic Gel OU QAM., last used this morning. Review of Systems      Main Ophthalmology Exam     Slit Lamp Exam       Right Left    Lids/Lashes Mild Blepharitis. Mild MGD Mild Blepharitis. Mild MGD    Conjunctiva/Sclera Clear and White Clear and White    Cornea Clear Clear    Anterior Chamber Deep; No Cells, No Flare Deep; No Cells, No Flare    Iris Round and Reactive Round and Reactive    Lens Pseudophakia Pseudophakia                   Tonometry     Tonometry (Non-contact air puff, 9:22 AM)       Right Left    Pressure 17 12              Visual Acuity     Visual Acuity (Snellen - Linear)       Right Left    Dist cc 20/200 20/300    Dist ph cc 20/200 20/300    Correction:  Glasses              Pupils     Pupils       Dark Light Shape React APD    Right 3 2 Round Brisk None    Left 4 3 Round Slow +1                Not recorded         Extraocular Movement     Extraocular Movement       Right Left     Full, Ortho Full, Ortho               Not recorded          Past Medical History:   Diagnosis Date    Bilateral pseudophakia     Condyloma acuminata     history of. .burned off    Multiple sclerosis ([address]     Opaque posterior capsule     (Left eye worse than right).  Open-angle glaucoma     Optic atrophy     (right eye).     Optic neuritis     since 06/1996          Current Outpatient Medications:     clopidogrel (PLAVIX) 75 MG tablet, take 1 tablet by mouth once daily, Disp: 90 tablet, Rfl: 3    rosuvastatin (CRESTOR) 20 MG tablet, TAKE 1 TABLET BY MOUTH NIGHTLY, Disp: 90 tablet, Rfl: 3    clopidogrel (PLAVIX) 75 MG tablet, take 1 tablet by mouth once daily, Disp: 90 tablet, Rfl: 3    rosuvastatin (CRESTOR) 20 MG tablet,

## 2019-09-23 RX ORDER — LOSARTAN POTASSIUM 25 MG/1
TABLET ORAL
Qty: 90 TABLET | Refills: 3 | Status: SHIPPED | OUTPATIENT
Start: 2019-09-23

## 2019-10-01 RX ORDER — LOSARTAN POTASSIUM 25 MG/1
TABLET ORAL
Qty: 90 TABLET | Refills: 3 | Status: SHIPPED | OUTPATIENT
Start: 2019-10-01 | End: 2019-10-11 | Stop reason: CLARIF

## 2019-10-11 ENCOUNTER — OFFICE VISIT (OUTPATIENT)
Dept: CARDIOLOGY CLINIC | Age: 64
End: 2019-10-11
Payer: MEDICARE

## 2019-10-11 VITALS
SYSTOLIC BLOOD PRESSURE: 122 MMHG | WEIGHT: 166.6 LBS | DIASTOLIC BLOOD PRESSURE: 80 MMHG | BODY MASS INDEX: 26.09 KG/M2 | HEART RATE: 64 BPM

## 2019-10-11 DIAGNOSIS — E78.5 HYPERLIPIDEMIA, UNSPECIFIED HYPERLIPIDEMIA TYPE: Primary | ICD-10-CM

## 2019-10-11 DIAGNOSIS — I21.11 ST ELEVATION MYOCARDIAL INFARCTION INVOLVING RIGHT CORONARY ARTERY (HCC): ICD-10-CM

## 2019-10-11 PROCEDURE — 4040F PNEUMOC VAC/ADMIN/RCVD: CPT | Performed by: INTERNAL MEDICINE

## 2019-10-11 PROCEDURE — 99214 OFFICE O/P EST MOD 30 MIN: CPT | Performed by: INTERNAL MEDICINE

## 2019-10-11 PROCEDURE — G8598 ASA/ANTIPLAT THER USED: HCPCS | Performed by: INTERNAL MEDICINE

## 2019-10-11 PROCEDURE — 1090F PRES/ABSN URINE INCON ASSESS: CPT | Performed by: INTERNAL MEDICINE

## 2019-10-11 PROCEDURE — 1123F ACP DISCUSS/DSCN MKR DOCD: CPT | Performed by: INTERNAL MEDICINE

## 2019-10-11 PROCEDURE — 3017F COLORECTAL CA SCREEN DOC REV: CPT | Performed by: INTERNAL MEDICINE

## 2019-10-11 PROCEDURE — G8427 DOCREV CUR MEDS BY ELIG CLIN: HCPCS | Performed by: INTERNAL MEDICINE

## 2019-10-11 PROCEDURE — G8419 CALC BMI OUT NRM PARAM NOF/U: HCPCS | Performed by: INTERNAL MEDICINE

## 2019-10-11 PROCEDURE — G8400 PT W/DXA NO RESULTS DOC: HCPCS | Performed by: INTERNAL MEDICINE

## 2019-10-11 PROCEDURE — G8484 FLU IMMUNIZE NO ADMIN: HCPCS | Performed by: INTERNAL MEDICINE

## 2019-10-11 PROCEDURE — 1036F TOBACCO NON-USER: CPT | Performed by: INTERNAL MEDICINE

## 2019-10-11 RX ORDER — METOPROLOL SUCCINATE 25 MG/1
25 TABLET, EXTENDED RELEASE ORAL DAILY
Qty: 30 TABLET | Refills: 3 | Status: SHIPPED | OUTPATIENT
Start: 2019-10-11

## 2019-10-28 ENCOUNTER — TELEPHONE (OUTPATIENT)
Dept: CARDIOLOGY CLINIC | Age: 64
End: 2019-10-28

## 2019-11-04 RX ORDER — TIMOLOL MALEATE 5 MG/ML
SOLUTION OPHTHALMIC
Qty: 5 ML | Refills: 11 | Status: SHIPPED | OUTPATIENT
Start: 2019-11-04

## 2020-02-18 RX ORDER — CLOPIDOGREL BISULFATE 75 MG/1
TABLET ORAL
Qty: 90 TABLET | Refills: 3 | Status: SHIPPED | OUTPATIENT
Start: 2020-02-18

## 2020-02-18 RX ORDER — ASPIRIN 81 MG/1
81 TABLET, CHEWABLE ORAL DAILY
Qty: 90 TABLET | Refills: 3 | Status: SHIPPED | OUTPATIENT
Start: 2020-02-18

## 2020-02-18 RX ORDER — ROSUVASTATIN CALCIUM 20 MG/1
TABLET, COATED ORAL
Qty: 90 TABLET | Refills: 3 | Status: SHIPPED | OUTPATIENT
Start: 2020-02-18 | End: 2020-04-16

## 2020-03-17 RX ORDER — TIMOLOL MALEATE 5 MG/ML
SOLUTION OPHTHALMIC
Qty: 5 ML | Refills: 11 | Status: SHIPPED | OUTPATIENT
Start: 2020-03-17

## 2020-03-20 ENCOUNTER — TELEPHONE (OUTPATIENT)
Dept: OPTOMETRY | Age: 65
End: 2020-03-20

## 2020-03-23 RX ORDER — TIMOLOL MALEATE 5 MG/ML
1 SOLUTION/ DROPS OPHTHALMIC 2 TIMES DAILY
Qty: 1 BOTTLE | Refills: 5 | Status: SHIPPED | OUTPATIENT
Start: 2020-03-23 | End: 2020-04-22

## 2020-04-16 RX ORDER — ATORVASTATIN CALCIUM 40 MG/1
40 TABLET, FILM COATED ORAL DAILY
COMMUNITY

## 2023-05-18 ENCOUNTER — ANESTHESIA EVENT (OUTPATIENT)
Dept: OPERATING ROOM | Age: 68
End: 2023-05-18
Payer: MEDICARE

## 2023-05-18 RX ORDER — OXYBUTYNIN CHLORIDE 5 MG/1
5 TABLET ORAL DAILY
COMMUNITY

## 2023-05-22 ENCOUNTER — ANESTHESIA (OUTPATIENT)
Dept: OPERATING ROOM | Age: 68
End: 2023-05-22
Payer: MEDICARE

## 2023-05-22 ENCOUNTER — HOSPITAL ENCOUNTER (OUTPATIENT)
Age: 68
Setting detail: OUTPATIENT SURGERY
Discharge: HOME | End: 2023-05-22
Attending: OBSTETRICS & GYNECOLOGY | Admitting: OBSTETRICS & GYNECOLOGY
Payer: MEDICARE

## 2023-05-22 VITALS
SYSTOLIC BLOOD PRESSURE: 136 MMHG | BODY MASS INDEX: 29.09 KG/M2 | WEIGHT: 181 LBS | DIASTOLIC BLOOD PRESSURE: 80 MMHG | HEIGHT: 66 IN | OXYGEN SATURATION: 98 % | RESPIRATION RATE: 10 BRPM | HEART RATE: 56 BPM | TEMPERATURE: 96.9 F

## 2023-05-22 DIAGNOSIS — N39.46 URGE AND STRESS INCONTINENCE: ICD-10-CM

## 2023-05-22 DIAGNOSIS — D28.0 BENIGN NEOPLASM OF VULVA: ICD-10-CM

## 2023-05-22 PROBLEM — Z92.29 S/P BOTOX INJECTION: Status: ACTIVE | Noted: 2023-05-22

## 2023-05-22 PROCEDURE — 6360000002 HC RX W HCPCS: Performed by: NURSE ANESTHETIST, CERTIFIED REGISTERED

## 2023-05-22 PROCEDURE — 88112 CYTOPATH CELL ENHANCE TECH: CPT

## 2023-05-22 PROCEDURE — 88305 TISSUE EXAM BY PATHOLOGIST: CPT

## 2023-05-22 PROCEDURE — 3700000000 HC ANESTHESIA ATTENDED CARE: Performed by: OBSTETRICS & GYNECOLOGY

## 2023-05-22 PROCEDURE — 2709999900 HC NON-CHARGEABLE SUPPLY: Performed by: OBSTETRICS & GYNECOLOGY

## 2023-05-22 PROCEDURE — 7100000010 HC PHASE II RECOVERY - FIRST 15 MIN: Performed by: OBSTETRICS & GYNECOLOGY

## 2023-05-22 PROCEDURE — 2580000003 HC RX 258: Performed by: ANESTHESIOLOGY

## 2023-05-22 PROCEDURE — 7100000011 HC PHASE II RECOVERY - ADDTL 15 MIN: Performed by: OBSTETRICS & GYNECOLOGY

## 2023-05-22 PROCEDURE — 2500000003 HC RX 250 WO HCPCS: Performed by: OBSTETRICS & GYNECOLOGY

## 2023-05-22 PROCEDURE — 3600000014 HC SURGERY LEVEL 4 ADDTL 15MIN: Performed by: OBSTETRICS & GYNECOLOGY

## 2023-05-22 PROCEDURE — 3600000004 HC SURGERY LEVEL 4 BASE: Performed by: OBSTETRICS & GYNECOLOGY

## 2023-05-22 PROCEDURE — 6360000002 HC RX W HCPCS: Performed by: OBSTETRICS & GYNECOLOGY

## 2023-05-22 PROCEDURE — 3700000001 HC ADD 15 MINUTES (ANESTHESIA): Performed by: OBSTETRICS & GYNECOLOGY

## 2023-05-22 PROCEDURE — 2500000003 HC RX 250 WO HCPCS: Performed by: NURSE ANESTHETIST, CERTIFIED REGISTERED

## 2023-05-22 RX ORDER — CEFAZOLIN SODIUM 1 G/3ML
INJECTION, POWDER, FOR SOLUTION INTRAMUSCULAR; INTRAVENOUS PRN
Status: DISCONTINUED | OUTPATIENT
Start: 2023-05-22 | End: 2023-05-22 | Stop reason: SDUPTHER

## 2023-05-22 RX ORDER — METOCLOPRAMIDE HYDROCHLORIDE 5 MG/ML
10 INJECTION INTRAMUSCULAR; INTRAVENOUS
Status: DISCONTINUED | OUTPATIENT
Start: 2023-05-22 | End: 2023-05-22 | Stop reason: HOSPADM

## 2023-05-22 RX ORDER — SODIUM CHLORIDE 0.9 % (FLUSH) 0.9 %
5-40 SYRINGE (ML) INJECTION PRN
Status: DISCONTINUED | OUTPATIENT
Start: 2023-05-22 | End: 2023-05-22 | Stop reason: HOSPADM

## 2023-05-22 RX ORDER — ONDANSETRON 4 MG/1
4 TABLET, ORALLY DISINTEGRATING ORAL EVERY 8 HOURS PRN
Qty: 10 TABLET | Refills: 0 | Status: SHIPPED | OUTPATIENT
Start: 2023-05-22

## 2023-05-22 RX ORDER — ONDANSETRON 2 MG/ML
INJECTION INTRAMUSCULAR; INTRAVENOUS PRN
Status: DISCONTINUED | OUTPATIENT
Start: 2023-05-22 | End: 2023-05-22 | Stop reason: SDUPTHER

## 2023-05-22 RX ORDER — CEPHALEXIN 500 MG/1
500 CAPSULE ORAL 3 TIMES DAILY
Qty: 21 CAPSULE | Refills: 0 | Status: SHIPPED | OUTPATIENT
Start: 2023-05-22 | End: 2023-05-22 | Stop reason: SDUPTHER

## 2023-05-22 RX ORDER — CEPHALEXIN 500 MG/1
500 CAPSULE ORAL 3 TIMES DAILY
Qty: 21 CAPSULE | Refills: 0 | Status: SHIPPED | OUTPATIENT
Start: 2023-05-22 | End: 2023-05-29

## 2023-05-22 RX ORDER — ONDANSETRON 2 MG/ML
4 INJECTION INTRAMUSCULAR; INTRAVENOUS
Status: DISCONTINUED | OUTPATIENT
Start: 2023-05-22 | End: 2023-05-22 | Stop reason: HOSPADM

## 2023-05-22 RX ORDER — HYDRALAZINE HYDROCHLORIDE 20 MG/ML
10 INJECTION INTRAMUSCULAR; INTRAVENOUS
Status: DISCONTINUED | OUTPATIENT
Start: 2023-05-22 | End: 2023-05-22 | Stop reason: HOSPADM

## 2023-05-22 RX ORDER — SODIUM CHLORIDE 0.9 % (FLUSH) 0.9 %
5-40 SYRINGE (ML) INJECTION EVERY 12 HOURS SCHEDULED
Status: DISCONTINUED | OUTPATIENT
Start: 2023-05-22 | End: 2023-05-22 | Stop reason: HOSPADM

## 2023-05-22 RX ORDER — SODIUM CHLORIDE, SODIUM LACTATE, POTASSIUM CHLORIDE, CALCIUM CHLORIDE 600; 310; 30; 20 MG/100ML; MG/100ML; MG/100ML; MG/100ML
INJECTION, SOLUTION INTRAVENOUS CONTINUOUS
Status: DISCONTINUED | OUTPATIENT
Start: 2023-05-22 | End: 2023-05-22 | Stop reason: HOSPADM

## 2023-05-22 RX ORDER — BUPIVACAINE HYDROCHLORIDE 5 MG/ML
INJECTION, SOLUTION EPIDURAL; INTRACAUDAL
Status: DISCONTINUED
Start: 2023-05-22 | End: 2023-05-22 | Stop reason: HOSPADM

## 2023-05-22 RX ORDER — TAMSULOSIN HYDROCHLORIDE 0.4 MG/1
0.4 CAPSULE ORAL DAILY
Qty: 7 CAPSULE | Refills: 0 | Status: SHIPPED | OUTPATIENT
Start: 2023-05-22 | End: 2023-05-29

## 2023-05-22 RX ORDER — SENNOSIDES 8.6 MG
8.6 CAPSULE ORAL 2 TIMES DAILY
Qty: 30 CAPSULE | Refills: 0 | Status: SHIPPED | OUTPATIENT
Start: 2023-05-22 | End: 2023-06-21

## 2023-05-22 RX ORDER — PROPOFOL 10 MG/ML
INJECTION, EMULSION INTRAVENOUS PRN
Status: DISCONTINUED | OUTPATIENT
Start: 2023-05-22 | End: 2023-05-22 | Stop reason: SDUPTHER

## 2023-05-22 RX ORDER — MORPHINE SULFATE 2 MG/ML
1 INJECTION, SOLUTION INTRAMUSCULAR; INTRAVENOUS EVERY 5 MIN PRN
Status: DISCONTINUED | OUTPATIENT
Start: 2023-05-22 | End: 2023-05-22 | Stop reason: HOSPADM

## 2023-05-22 RX ORDER — MIDAZOLAM HYDROCHLORIDE 1 MG/ML
INJECTION INTRAMUSCULAR; INTRAVENOUS PRN
Status: DISCONTINUED | OUTPATIENT
Start: 2023-05-22 | End: 2023-05-22 | Stop reason: SDUPTHER

## 2023-05-22 RX ORDER — SODIUM CHLORIDE 9 MG/ML
25 INJECTION, SOLUTION INTRAVENOUS PRN
Status: DISCONTINUED | OUTPATIENT
Start: 2023-05-22 | End: 2023-05-22 | Stop reason: HOSPADM

## 2023-05-22 RX ORDER — DIPHENHYDRAMINE HYDROCHLORIDE 50 MG/ML
12.5 INJECTION INTRAMUSCULAR; INTRAVENOUS
Status: DISCONTINUED | OUTPATIENT
Start: 2023-05-22 | End: 2023-05-22 | Stop reason: HOSPADM

## 2023-05-22 RX ORDER — LIDOCAINE HYDROCHLORIDE 10 MG/ML
INJECTION, SOLUTION INFILTRATION; PERINEURAL PRN
Status: DISCONTINUED | OUTPATIENT
Start: 2023-05-22 | End: 2023-05-22 | Stop reason: SDUPTHER

## 2023-05-22 RX ORDER — BUPIVACAINE HYDROCHLORIDE 5 MG/ML
INJECTION, SOLUTION PERINEURAL PRN
Status: DISCONTINUED | OUTPATIENT
Start: 2023-05-22 | End: 2023-05-22 | Stop reason: ALTCHOICE

## 2023-05-22 RX ORDER — OXYCODONE HYDROCHLORIDE 5 MG/1
10 TABLET ORAL PRN
Status: DISCONTINUED | OUTPATIENT
Start: 2023-05-22 | End: 2023-05-22 | Stop reason: HOSPADM

## 2023-05-22 RX ORDER — IBUPROFEN 600 MG/1
600 TABLET ORAL EVERY 6 HOURS PRN
Qty: 60 TABLET | Refills: 0 | Status: SHIPPED | OUTPATIENT
Start: 2023-05-22 | End: 2023-06-21

## 2023-05-22 RX ORDER — FENTANYL CITRATE 50 UG/ML
INJECTION, SOLUTION INTRAMUSCULAR; INTRAVENOUS PRN
Status: DISCONTINUED | OUTPATIENT
Start: 2023-05-22 | End: 2023-05-22 | Stop reason: SDUPTHER

## 2023-05-22 RX ORDER — SODIUM CHLORIDE 9 MG/ML
INJECTION, SOLUTION INTRAVENOUS PRN
Status: DISCONTINUED | OUTPATIENT
Start: 2023-05-22 | End: 2023-05-22 | Stop reason: HOSPADM

## 2023-05-22 RX ORDER — DEXAMETHASONE SODIUM PHOSPHATE 10 MG/ML
INJECTION, SOLUTION INTRAMUSCULAR; INTRAVENOUS PRN
Status: DISCONTINUED | OUTPATIENT
Start: 2023-05-22 | End: 2023-05-22 | Stop reason: SDUPTHER

## 2023-05-22 RX ORDER — KETOROLAC TROMETHAMINE 30 MG/ML
INJECTION, SOLUTION INTRAMUSCULAR; INTRAVENOUS PRN
Status: DISCONTINUED | OUTPATIENT
Start: 2023-05-22 | End: 2023-05-22 | Stop reason: SDUPTHER

## 2023-05-22 RX ORDER — SODIUM CHLORIDE 9 MG/ML
INJECTION INTRAVENOUS
Status: DISCONTINUED
Start: 2023-05-22 | End: 2023-05-22 | Stop reason: HOSPADM

## 2023-05-22 RX ORDER — MEPERIDINE HYDROCHLORIDE 50 MG/ML
12.5 INJECTION INTRAMUSCULAR; INTRAVENOUS; SUBCUTANEOUS ONCE
Status: DISCONTINUED | OUTPATIENT
Start: 2023-05-22 | End: 2023-05-22 | Stop reason: HOSPADM

## 2023-05-22 RX ORDER — OXYCODONE HYDROCHLORIDE 5 MG/1
5 TABLET ORAL PRN
Status: DISCONTINUED | OUTPATIENT
Start: 2023-05-22 | End: 2023-05-22 | Stop reason: HOSPADM

## 2023-05-22 RX ADMIN — PROPOFOL 25 MG: 10 INJECTION, EMULSION INTRAVENOUS at 14:50

## 2023-05-22 RX ADMIN — PROPOFOL 25 MG: 10 INJECTION, EMULSION INTRAVENOUS at 15:02

## 2023-05-22 RX ADMIN — PROPOFOL 25 MG: 10 INJECTION, EMULSION INTRAVENOUS at 14:38

## 2023-05-22 RX ADMIN — PROPOFOL 25 MG: 10 INJECTION, EMULSION INTRAVENOUS at 14:41

## 2023-05-22 RX ADMIN — PROPOFOL 25 MG: 10 INJECTION, EMULSION INTRAVENOUS at 14:59

## 2023-05-22 RX ADMIN — SODIUM CHLORIDE, POTASSIUM CHLORIDE, SODIUM LACTATE AND CALCIUM CHLORIDE: 600; 310; 30; 20 INJECTION, SOLUTION INTRAVENOUS at 14:20

## 2023-05-22 RX ADMIN — PROPOFOL 25 MG: 10 INJECTION, EMULSION INTRAVENOUS at 15:05

## 2023-05-22 RX ADMIN — PROPOFOL 50 MG: 10 INJECTION, EMULSION INTRAVENOUS at 14:34

## 2023-05-22 RX ADMIN — KETOROLAC TROMETHAMINE 15 MG: 30 INJECTION, SOLUTION INTRAMUSCULAR at 14:57

## 2023-05-22 RX ADMIN — PROPOFOL 25 MG: 10 INJECTION, EMULSION INTRAVENOUS at 14:47

## 2023-05-22 RX ADMIN — PROPOFOL 25 MG: 10 INJECTION, EMULSION INTRAVENOUS at 14:56

## 2023-05-22 RX ADMIN — ONDANSETRON 4 MG: 2 INJECTION INTRAMUSCULAR; INTRAVENOUS at 14:40

## 2023-05-22 RX ADMIN — MIDAZOLAM 2 MG: 1 INJECTION INTRAMUSCULAR; INTRAVENOUS at 14:30

## 2023-05-22 RX ADMIN — CEFAZOLIN 2 G: 1 INJECTION, POWDER, FOR SOLUTION INTRAMUSCULAR; INTRAVENOUS at 14:50

## 2023-05-22 RX ADMIN — LIDOCAINE HYDROCHLORIDE 40 MG: 10 INJECTION, SOLUTION INFILTRATION; PERINEURAL at 14:34

## 2023-05-22 RX ADMIN — PROPOFOL 25 MG: 10 INJECTION, EMULSION INTRAVENOUS at 14:44

## 2023-05-22 RX ADMIN — FENTANYL CITRATE 25 MCG: 50 INJECTION, SOLUTION INTRAMUSCULAR; INTRAVENOUS at 14:39

## 2023-05-22 RX ADMIN — FENTANYL CITRATE 25 MCG: 50 INJECTION, SOLUTION INTRAMUSCULAR; INTRAVENOUS at 14:44

## 2023-05-22 RX ADMIN — FENTANYL CITRATE 50 MCG: 50 INJECTION, SOLUTION INTRAMUSCULAR; INTRAVENOUS at 14:34

## 2023-05-22 RX ADMIN — PROPOFOL 25 MG: 10 INJECTION, EMULSION INTRAVENOUS at 14:53

## 2023-05-22 RX ADMIN — DEXAMETHASONE SODIUM PHOSPHATE 10 MG: 10 INJECTION, SOLUTION INTRAMUSCULAR; INTRAVENOUS at 14:38

## 2023-05-22 RX ADMIN — SODIUM CHLORIDE, POTASSIUM CHLORIDE, SODIUM LACTATE AND CALCIUM CHLORIDE: 600; 310; 30; 20 INJECTION, SOLUTION INTRAVENOUS at 14:55

## 2023-05-22 ASSESSMENT — PAIN - FUNCTIONAL ASSESSMENT: PAIN_FUNCTIONAL_ASSESSMENT: 0-10

## 2023-05-22 ASSESSMENT — LIFESTYLE VARIABLES: SMOKING_STATUS: 1

## 2023-05-22 NOTE — DISCHARGE INSTRUCTIONS
POSTOPERATIVE PATIENT INSTRUCTIONS  MAJOR & MINOR SURGICAL PROCEDURES     Congratulations! You have taken the brave step of undergoing a surgery in order to try to improve your health. Now it is time to focus on healing outside of the hospital / surgery center setting. To make your dismissal as successful as possible, it is recommended that you thoroughly review these postoperative instructions.  These instructions pertain to, but are not limited to the following procedures:      MAJOR   Hysterectomy - Vaginal / Laparoscopic / Robotic   With or Without tube-ovarian Removal (Salpingo-oophorectomy)   Sacrocolpopexy / Sacroperineopexy / Sacrocervicopexy (Lifting the vagina / cervix to the sacrum)  Major Vaginal Prolapse repairs   Cystocele repair (Anterior Colporrhaphy)   Rectocele repair (Posterior Colporrhaphy)   Enterocele repair    Vaginal vault repair   Closing or removal of the vagina (Colpocleisis / Colpectomy)   Major urinary incontinence surgery (Gudino Urethropexy, MMK)   Major fibroid removal (Myomectomy)   Major tubal surgery (re-anastomosis, ectopic pregnancy, pelvic inflammatory disease)   Major surgery for adhesions or endometriosis involving bowel   Major vaginal mesh removal    Fistula repair of the bladder or colorectum to the vagina   Creation of a new vagina (Neovaginoplasty) or repair of a Mullerian Anomaly   Other       MINOR   Hysteroscopy with Dilatation / Curettage, Endometrial Ablation   Laparoscopy With or Without minor tubal or ovarian surgery   Minor Vaginal Prolapse repairs   Cystocele repair (Anterior Colporrhaphy)   Rectocele repair (Posterior Colporrhaphy)   Urethrocele repair    Minor urinary incontinence surgery (Slings, Transurethral Bulking)   Minor vaginal surgery (Mesh removal, Laser ablation, Biopsies, Labial revisions, Injections)    Minor surgery of the bladder (DMSO, Hydrodistention, Botox, etc.)   Other       1       POST OPERATIVE BASIC INSTRUCTIONS  The office will

## 2023-05-22 NOTE — ANESTHESIA POSTPROCEDURE EVALUATION
POST- ANESTHESIA EVALUATION       Pt Name: Ashley Greer  MRN: [id number]  YOB: 1955  Date of evaluation: 5/22/2023  Time:  3:42 PM      /69   Pulse 57   Temp 96.9 °F (36.1 °C) (Temporal)   Resp 10   Ht 5' 6\" (1.676 m)   Wt 181 lb (82.1 kg)   SpO2 90%   BMI 29.21 kg/m²      Consciousness Level  Awake  Cardiopulmonary Status  Stable  Pain Adequately Treated YES  Nausea / Vomiting  NO  Adequate Hydration  YES  Anesthesia Related Complications NONE      Electronically signed by Fco Guerrero MD on 5/22/2023 at 3:42 PM       Department of Anesthesiology  Postprocedure Note    Patient: Ashley Greer  MRN: [id number]  YOB: 1955  Date of evaluation: 5/22/2023      Procedure Summary     Date: 05/22/23 Room / Location: [facility] OR [address] [place] [address]    Anesthesia Start: 9646 Anesthesia Stop: 3203    Procedures:       VULVA LESION BIOPSY EXCISION      CYSTOSCOPY INJECTION BOTOX TO BLADDER WITH LOCAL Diagnosis:       Urge and stress incontinence      Benign neoplasm of vulva      (Urge and stress incontinence [N39.46])      (Benign neoplasm of vulva [D28.0])    Surgeons:  Ana Crawley DO Responsible Provider: Fco Guerrero MD    Anesthesia Type: MAC ASA Status: 3          Anesthesia Type: MAC    John Phase I: John Score: 10    John Phase II: John Score: 9      Anesthesia Post Evaluation

## 2023-05-22 NOTE — DISCHARGE SUMMARY
Gyn Discharge Summary  [facility]      Patient Name: Regulo Goldsmith  Patient : 1955  Primary Care Physician: Jacquelin Vela MD  Admit Date: 2023    Principal Diagnosis: Urinary Incontinence, Vulvar Nevus     Other Diagnosis:   Urge and stress incontinence [N39.46]  Benign neoplasm of vulva [D28.0]  Patient Active Problem List   Diagnosis    Optic atrophy    STEMI (ST elevation myocardial infarction) ([facility]    Multiple sclerosis ([facility]    Tobacco abuse    Coronary artery disease involving native coronary artery of native heart without angina pectoris    S/P primary angioplasty with coronary stent    Mixed hyperlipidemia    Optic neuropathy, inflammatory    Ocular hypertension, bilateral    Pseudophakia of both eyes    S/P Botox injection to bladder, Vulvar lesion excision       Infection: No  Hospital Acquired: N/A    Surgical Operations & Procedures: Excision of lesion of vulva, Cystoscopy, Botox injection of bladder    Consultations:  Anesthesia    Pertinent Findings & Procedures:   Regulo Goldsmith is a 69 y.o. female No obstetric history on file. , admitted for elective surgery; received nothing pre-operatively. She underwent Excision of lesion of vulva, Cystoscopy, Botox injection of bladder on 23. Void trial was successful. Post-op course normal, discharged home . Follow up in 2 weeks. Discharge instructions reviewed and questions answered.     Course of patient: normal    Discharge to: Home    Readmission planned: No    Recommendations on Discharge:     Medications:     Medication List        START taking these medications      atorvastatin 40 MG tablet  Commonly known as: LIPITOR     cephALEXin 500 MG capsule  Commonly known as: Keflex  Take 1 capsule by mouth 3 times daily for 7 days Please take for 7 days     ibuprofen 600 MG tablet  Commonly known as: ADVIL;MOTRIN  Take 1 tablet by mouth every 6 hours as needed for Pain     ondansetron 4 MG disintegrating

## 2023-05-22 NOTE — ANESTHESIA PRE PROCEDURE
Encounters:   10/11/19 122/80   04/12/19 122/70   12/14/18 108/68       NPO Status:                                                                                 BMI:   Wt Readings from Last 3 Encounters:   10/11/19 166 lb 9.6 oz (75.6 kg)   04/12/19 173 lb (78.5 kg)   10/05/18 172 lb (78 kg)     Body mass index is 29.05 kg/m². CBC:   Lab Results   Component Value Date/Time    WBC 10.9 01/22/2018 12:34 PM    RBC 3.83 01/22/2018 12:34 PM    HGB 11.4 01/22/2018 12:34 PM    HCT 35.6 01/22/2018 12:34 PM    MCV 93.0 01/22/2018 12:34 PM    RDW 14.9 01/22/2018 12:34 PM     01/22/2018 12:34 PM       CMP:   Lab Results   Component Value Date/Time     01/21/2018 05:09 AM    K 4.1 01/21/2018 05:09 AM     01/21/2018 05:09 AM    CO2 21 01/21/2018 05:09 AM    BUN 17 01/21/2018 05:09 AM    CREATININE 0.68 01/21/2018 05:09 AM    GFRAA >60 01/21/2018 05:09 AM    LABGLOM >60 01/21/2018 05:09 AM    GLUCOSE 99 01/21/2018 05:09 AM    CALCIUM 8.6 01/21/2018 05:09 AM       POC Tests: No results for input(s): POCGLU, POCNA, POCK, POCCL, POCBUN, POCHEMO, POCHCT in the last 72 hours.     Coags: No results found for: PROTIME, INR, APTT    HCG (If Applicable): No results found for: PREGTESTUR, PREGSERUM, HCG, HCGQUANT     ABGs: No results found for: PHART, PO2ART, JLP4DJG, NPO3UQJ, BEART, B1PBIUDI     Type & Screen (If Applicable):  No results found for: LABABO, LABRH    Drug/Infectious Status (If Applicable):  No results found for: HIV, HEPCAB    COVID-19 Screening (If Applicable): No results found for: COVID19        Anesthesia Evaluation  Patient summary reviewed and Nursing notes reviewed no history of anesthetic complications:   Airway: Mallampati: II  TM distance: >3 FB   Neck ROM: full  Mouth opening: > = 3 FB   Dental: normal exam         Pulmonary:normal exam  breath sounds clear to auscultation  (+) current smoker                           Cardiovascular:    (+) hypertension:, past MI:, CAD:, CABG/stent:,

## 2023-05-22 NOTE — H&P
UroGyn Pre-Op H&P  [facility]    Patient Name: Marleny Holman     Patient : 1955  Room/Bed: STZ OR [place] RM/NONE  Admission Date/Time: 2023 12:52 PM  Primary Care Physician: Dulcy Meckel, MD  MRN: [id number]    Date: 2023  Time: 2:21 PM    The patient was seen in pre-op holding. She is here for Elective Surgery - Excision of lesion of vulva, Cystoscopy, Botox injection of bladder. The patient is being admitted for a planned elective surgical procedure today. She has had symptoms, physical findings, and diagnostic testing that have an appropriate indication for today's planned procedure. The patient has been educated about their condition, conservative and surgical options was been offered to the patient, and the patient has decided to proceed with a surgical option. An informed consent has been signed under no duress or confusion. If indicated, the patient has been surgically cleared by her PCP and /or any pertinent specialist. All labs and testing have been reviewed. If of reproductive age and without permanent sterilization, a pregnancy test was confirmed as negative. The patient has satisfactorily completed any pre-operative preparations prior to surgery. If MRSA positive, she had completed the standard surgical preparation protocol. The patient took any required medicines prior to surgery with a sip of water but otherwise had taken nothing by mouth. The patient has discontinued any blood thinners as required to proceed with surgery. The patient denies chest pain, shortness of breath, calf pain, or open sores on the day of surgery. All dentures and body jewelry have been removed and / or taped. REVIEW OF SYSTEMS:  14 point ROS negative except for above listed in HPI.    General/Constitutional: Denies chills, fever, headaches, weight gain, weight loss   Ophthalmologic: Denies recent abrupt vision changes, blurry vision   ENT: Denies nasal discharge, congestion, sinus

## 2023-05-23 LAB — SURGICAL PATHOLOGY REPORT: NORMAL

## 2023-05-23 NOTE — OP NOTE
[address]                  [facility]                                OPERATIVE REPORT    PATIENT NAME: PAXTON VALDEZ                    :        1955  MED REC NO:   [id number]                             ROOM:  ACCOUNT NO:   [de-identified]                           ADMIT DATE: 2023  PROVIDER:     Jillian Walls DO    DATE OF PROCEDURE:  2023    INDICATIONS FOR SURGERY:  Refractory urge urinary incontinence with  urgency and frequency of the bladder also, a large vulvar nevus  atypical.    PREOPERATIVE DIAGNOSES:  Refractory urge urinary incontinence with  urgency and frequency of the bladder also, a large vulvar nevus  atypical.    POSTOPERATIVE DIAGNOSES:  Erosive squamative-type cystitis noninfectious  origin and refractory urge urinary incontinence with urgency and  frequency of the bladder also, a large vulvar nevus atypical.    OPERATIONS PERFORMED:  Cystourethroscopy with 100 units Botox injection  of the bladder and excision of the nevus. SURGEON:  Jojo Baldwin DO    ASSISTANT:  Dereck Jacques D.O. ANESTHESIA:  MAC. FINDINGS:  As above. SPECIMENS:  Urine for cytology and vulvar nevus. ESTIMATED BLOOD LOSS:  Negligible. COMPLICATIONS:  None. DRAINS:  None. PACKING:  None. COURSE PRIOR TO THE PROCEDURE:  Risks and benefits of the procedure  explained to the patient. The patient understood and signed informed  consent under no duress or confusion. She was taken back to the OR and  prepped and draped in sterile fashion in dorsal lithotomy position, and  confirmed to be neutrally positioned by myself under MAC anesthesia. Surgical time-out was taken. An operative 17-Scottish 30 degrees  cystoscope was introduced into the bladder to reveal an erosive  squamative-type cystitis with red demarcations super trigonly and  involving the ureteral orifices.   Both ureters effluxed urine well

## 2023-05-24 LAB — DERMATOLOGY PATHOLOGY REPORT: NORMAL

## 2023-06-21 PROBLEM — Z92.29 S/P BOTOX INJECTION: Status: RESOLVED | Noted: 2023-05-22 | Resolved: 2023-06-21

## 2023-07-14 ENCOUNTER — ANESTHESIA EVENT (OUTPATIENT)
Dept: OPERATING ROOM | Age: 68
End: 2023-07-14
Payer: MEDICARE

## 2023-07-17 ENCOUNTER — HOSPITAL ENCOUNTER (OUTPATIENT)
Age: 68
Setting detail: OUTPATIENT SURGERY
Discharge: HOME | End: 2023-07-17
Attending: OBSTETRICS & GYNECOLOGY | Admitting: OBSTETRICS & GYNECOLOGY
Payer: MEDICARE

## 2023-07-17 ENCOUNTER — ANESTHESIA (OUTPATIENT)
Dept: OPERATING ROOM | Age: 68
End: 2023-07-17
Payer: MEDICARE

## 2023-07-17 VITALS
OXYGEN SATURATION: 93 % | SYSTOLIC BLOOD PRESSURE: 136 MMHG | RESPIRATION RATE: 10 BRPM | TEMPERATURE: 97 F | HEIGHT: 66 IN | BODY MASS INDEX: 29.25 KG/M2 | WEIGHT: 182 LBS | DIASTOLIC BLOOD PRESSURE: 78 MMHG | HEART RATE: 77 BPM

## 2023-07-17 DIAGNOSIS — R35.0 URINARY FREQUENCY: ICD-10-CM

## 2023-07-17 DIAGNOSIS — N39.0 RECURRENT UTI: ICD-10-CM

## 2023-07-17 PROBLEM — Z98.890 HISTORY OF BIOPSY OF BLADDER: Status: ACTIVE | Noted: 2023-07-17

## 2023-07-17 PROCEDURE — 3600000013 HC SURGERY LEVEL 3 ADDTL 15MIN: Performed by: OBSTETRICS & GYNECOLOGY

## 2023-07-17 PROCEDURE — 6360000002 HC RX W HCPCS: Performed by: NURSE ANESTHETIST, CERTIFIED REGISTERED

## 2023-07-17 PROCEDURE — 87070 CULTURE OTHR SPECIMN AEROBIC: CPT

## 2023-07-17 PROCEDURE — 7100000000 HC PACU RECOVERY - FIRST 15 MIN: Performed by: OBSTETRICS & GYNECOLOGY

## 2023-07-17 PROCEDURE — 87205 SMEAR GRAM STAIN: CPT

## 2023-07-17 PROCEDURE — 87176 TISSUE HOMOGENIZATION CULTR: CPT

## 2023-07-17 PROCEDURE — 2709999900 HC NON-CHARGEABLE SUPPLY: Performed by: OBSTETRICS & GYNECOLOGY

## 2023-07-17 PROCEDURE — 7100000010 HC PHASE II RECOVERY - FIRST 15 MIN: Performed by: OBSTETRICS & GYNECOLOGY

## 2023-07-17 PROCEDURE — 2500000003 HC RX 250 WO HCPCS

## 2023-07-17 PROCEDURE — 7100000011 HC PHASE II RECOVERY - ADDTL 15 MIN: Performed by: OBSTETRICS & GYNECOLOGY

## 2023-07-17 PROCEDURE — 3600000003 HC SURGERY LEVEL 3 BASE: Performed by: OBSTETRICS & GYNECOLOGY

## 2023-07-17 PROCEDURE — 7100000001 HC PACU RECOVERY - ADDTL 15 MIN: Performed by: OBSTETRICS & GYNECOLOGY

## 2023-07-17 PROCEDURE — 2580000003 HC RX 258: Performed by: ANESTHESIOLOGY

## 2023-07-17 PROCEDURE — 3700000001 HC ADD 15 MINUTES (ANESTHESIA): Performed by: OBSTETRICS & GYNECOLOGY

## 2023-07-17 PROCEDURE — 87076 CULTURE ANAEROBE IDENT EACH: CPT

## 2023-07-17 PROCEDURE — 87077 CULTURE AEROBIC IDENTIFY: CPT

## 2023-07-17 PROCEDURE — 88305 TISSUE EXAM BY PATHOLOGIST: CPT

## 2023-07-17 PROCEDURE — 3700000000 HC ANESTHESIA ATTENDED CARE: Performed by: OBSTETRICS & GYNECOLOGY

## 2023-07-17 PROCEDURE — 87075 CULTR BACTERIA EXCEPT BLOOD: CPT

## 2023-07-17 RX ORDER — PROMETHAZINE HYDROCHLORIDE 25 MG/ML
6.25 INJECTION, SOLUTION INTRAMUSCULAR; INTRAVENOUS EVERY 5 MIN PRN
Status: DISCONTINUED | OUTPATIENT
Start: 2023-07-17 | End: 2023-07-17 | Stop reason: HOSPADM

## 2023-07-17 RX ORDER — SODIUM CHLORIDE 0.9 % (FLUSH) 0.9 %
5-40 SYRINGE (ML) INJECTION EVERY 12 HOURS SCHEDULED
Status: DISCONTINUED | OUTPATIENT
Start: 2023-07-17 | End: 2023-07-17 | Stop reason: HOSPADM

## 2023-07-17 RX ORDER — HYDRALAZINE HYDROCHLORIDE 20 MG/ML
10 INJECTION INTRAMUSCULAR; INTRAVENOUS
Status: DISCONTINUED | OUTPATIENT
Start: 2023-07-17 | End: 2023-07-17 | Stop reason: HOSPADM

## 2023-07-17 RX ORDER — ONDANSETRON 2 MG/ML
4 INJECTION INTRAMUSCULAR; INTRAVENOUS
Status: DISCONTINUED | OUTPATIENT
Start: 2023-07-17 | End: 2023-07-17 | Stop reason: HOSPADM

## 2023-07-17 RX ORDER — FENTANYL CITRATE 50 UG/ML
INJECTION, SOLUTION INTRAMUSCULAR; INTRAVENOUS PRN
Status: DISCONTINUED | OUTPATIENT
Start: 2023-07-17 | End: 2023-07-17 | Stop reason: SDUPTHER

## 2023-07-17 RX ORDER — OXYCODONE HYDROCHLORIDE AND ACETAMINOPHEN 5; 325 MG/1; MG/1
1 TABLET ORAL
Status: DISCONTINUED | OUTPATIENT
Start: 2023-07-17 | End: 2023-07-17 | Stop reason: HOSPADM

## 2023-07-17 RX ORDER — ONDANSETRON 2 MG/ML
INJECTION INTRAMUSCULAR; INTRAVENOUS PRN
Status: DISCONTINUED | OUTPATIENT
Start: 2023-07-17 | End: 2023-07-17 | Stop reason: SDUPTHER

## 2023-07-17 RX ORDER — MIDAZOLAM HYDROCHLORIDE 2 MG/2ML
2 INJECTION, SOLUTION INTRAMUSCULAR; INTRAVENOUS
Status: DISCONTINUED | OUTPATIENT
Start: 2023-07-17 | End: 2023-07-17 | Stop reason: HOSPADM

## 2023-07-17 RX ORDER — LABETALOL HYDROCHLORIDE 5 MG/ML
10 INJECTION, SOLUTION INTRAVENOUS
Status: DISCONTINUED | OUTPATIENT
Start: 2023-07-17 | End: 2023-07-17 | Stop reason: HOSPADM

## 2023-07-17 RX ORDER — GLYCOPYRROLATE 0.2 MG/ML
INJECTION INTRAMUSCULAR; INTRAVENOUS
Status: COMPLETED
Start: 2023-07-17 | End: 2023-07-17

## 2023-07-17 RX ORDER — MORPHINE SULFATE 2 MG/ML
2 INJECTION, SOLUTION INTRAMUSCULAR; INTRAVENOUS EVERY 5 MIN PRN
Status: DISCONTINUED | OUTPATIENT
Start: 2023-07-17 | End: 2023-07-17 | Stop reason: HOSPADM

## 2023-07-17 RX ORDER — DIPHENHYDRAMINE HYDROCHLORIDE 50 MG/ML
12.5 INJECTION INTRAMUSCULAR; INTRAVENOUS
Status: DISCONTINUED | OUTPATIENT
Start: 2023-07-17 | End: 2023-07-17 | Stop reason: HOSPADM

## 2023-07-17 RX ORDER — METOCLOPRAMIDE HYDROCHLORIDE 5 MG/ML
10 INJECTION INTRAMUSCULAR; INTRAVENOUS
Status: DISCONTINUED | OUTPATIENT
Start: 2023-07-17 | End: 2023-07-17 | Stop reason: HOSPADM

## 2023-07-17 RX ORDER — SODIUM CHLORIDE 9 MG/ML
INJECTION, SOLUTION INTRAVENOUS PRN
Status: DISCONTINUED | OUTPATIENT
Start: 2023-07-17 | End: 2023-07-17 | Stop reason: HOSPADM

## 2023-07-17 RX ORDER — GLYCOPYRROLATE 0.2 MG/ML
0.4 INJECTION INTRAMUSCULAR; INTRAVENOUS ONCE
Status: COMPLETED | OUTPATIENT
Start: 2023-07-17 | End: 2023-07-17

## 2023-07-17 RX ORDER — PROPOFOL 10 MG/ML
INJECTION, EMULSION INTRAVENOUS CONTINUOUS PRN
Status: DISCONTINUED | OUTPATIENT
Start: 2023-07-17 | End: 2023-07-17 | Stop reason: SDUPTHER

## 2023-07-17 RX ORDER — IPRATROPIUM BROMIDE AND ALBUTEROL SULFATE 2.5; .5 MG/3ML; MG/3ML
1 SOLUTION RESPIRATORY (INHALATION)
Status: DISCONTINUED | OUTPATIENT
Start: 2023-07-17 | End: 2023-07-17 | Stop reason: HOSPADM

## 2023-07-17 RX ORDER — SULFAMETHOXAZOLE AND TRIMETHOPRIM 800; 160 MG/1; MG/1
1 TABLET ORAL 2 TIMES DAILY
Qty: 20 TABLET | Refills: 0 | Status: SHIPPED | OUTPATIENT
Start: 2023-07-17 | End: 2023-07-27

## 2023-07-17 RX ORDER — SODIUM CHLORIDE, SODIUM LACTATE, POTASSIUM CHLORIDE, CALCIUM CHLORIDE 600; 310; 30; 20 MG/100ML; MG/100ML; MG/100ML; MG/100ML
INJECTION, SOLUTION INTRAVENOUS CONTINUOUS
Status: DISCONTINUED | OUTPATIENT
Start: 2023-07-17 | End: 2023-07-17 | Stop reason: HOSPADM

## 2023-07-17 RX ORDER — SODIUM CHLORIDE 0.9 % (FLUSH) 0.9 %
5-40 SYRINGE (ML) INJECTION PRN
Status: DISCONTINUED | OUTPATIENT
Start: 2023-07-17 | End: 2023-07-17 | Stop reason: HOSPADM

## 2023-07-17 RX ORDER — MEPERIDINE HYDROCHLORIDE 50 MG/ML
12.5 INJECTION INTRAMUSCULAR; INTRAVENOUS; SUBCUTANEOUS EVERY 5 MIN PRN
Status: DISCONTINUED | OUTPATIENT
Start: 2023-07-17 | End: 2023-07-17 | Stop reason: HOSPADM

## 2023-07-17 RX ORDER — MIDAZOLAM HYDROCHLORIDE 1 MG/ML
INJECTION INTRAMUSCULAR; INTRAVENOUS PRN
Status: DISCONTINUED | OUTPATIENT
Start: 2023-07-17 | End: 2023-07-17 | Stop reason: SDUPTHER

## 2023-07-17 RX ORDER — OXYCODONE HYDROCHLORIDE AND ACETAMINOPHEN 5; 325 MG/1; MG/1
2 TABLET ORAL
Status: DISCONTINUED | OUTPATIENT
Start: 2023-07-17 | End: 2023-07-17 | Stop reason: HOSPADM

## 2023-07-17 RX ADMIN — PROPOFOL 10 MG: 10 INJECTION, EMULSION INTRAVENOUS at 11:32

## 2023-07-17 RX ADMIN — PROPOFOL 10 MG: 10 INJECTION, EMULSION INTRAVENOUS at 11:26

## 2023-07-17 RX ADMIN — GLYCOPYRROLATE 0.4 MG: 0.2 INJECTION INTRAMUSCULAR; INTRAVENOUS at 12:21

## 2023-07-17 RX ADMIN — MIDAZOLAM 2 MG: 1 INJECTION INTRAMUSCULAR; INTRAVENOUS at 11:19

## 2023-07-17 RX ADMIN — PROPOFOL 130 MCG/KG/MIN: 10 INJECTION, EMULSION INTRAVENOUS at 11:22

## 2023-07-17 RX ADMIN — ONDANSETRON 4 MG: 2 INJECTION INTRAMUSCULAR; INTRAVENOUS at 11:51

## 2023-07-17 RX ADMIN — FENTANYL CITRATE 50 MCG: 50 INJECTION, SOLUTION INTRAMUSCULAR; INTRAVENOUS at 11:22

## 2023-07-17 RX ADMIN — SODIUM CHLORIDE, POTASSIUM CHLORIDE, SODIUM LACTATE AND CALCIUM CHLORIDE: 600; 310; 30; 20 INJECTION, SOLUTION INTRAVENOUS at 11:19

## 2023-07-17 ASSESSMENT — PAIN - FUNCTIONAL ASSESSMENT: PAIN_FUNCTIONAL_ASSESSMENT: NONE - DENIES PAIN

## 2023-07-17 ASSESSMENT — LIFESTYLE VARIABLES: SMOKING_STATUS: 1

## 2023-07-17 NOTE — ANESTHESIA POSTPROCEDURE EVALUATION
Department of Anesthesiology  Postprocedure Note    Patient: Horace Collado  MRN: [id number]  YOB: 1955  Date of evaluation: 7/17/2023      Procedure Summary     Date: 07/17/23 Room / Location: [address]    Anesthesia Start: 6149 Anesthesia Stop: 8682    Procedure: CYSTOSCOPY WITH BLADDER BIOPSY Diagnosis:       Urinary frequency      Recurrent UTI      (Urinary frequency [R35.0])      (Recurrent UTI [N39.0])    Surgeons: Genoveva Hawk DO Responsible Provider: Desire Root MD    Anesthesia Type: MAC ASA Status: 3          Anesthesia Type: No value filed.     John Phase I: John Score: 7    John Phase II:        Anesthesia Post Evaluation    Patient location during evaluation: PACU  Patient participation: complete - patient participated  Level of consciousness: awake and alert  Airway patency: patent  Nausea & Vomiting: no nausea and no vomiting  Complications: no  Cardiovascular status: hemodynamically stable  Respiratory status: room air and spontaneous ventilation  Hydration status: euvolemic  Multimodal analgesia pain management approach

## 2023-07-17 NOTE — H&P
UroGyn Pre-Op H&P  [facility]    Patient Name: Kris Landis     Patient : 1955  Room/Bed: STZ OR [place] RM/NONE  Admission Date/Time: 2023  9:26 AM  Primary Care Physician: Ta Garcia MD  MRN: [id number]    Date: 2023  Time: 11:02 AM    The patient was seen in pre-op holding. She is here for previously scheduled cystoscopy with possible bladder biopsy. The patient is being admitted for a planned elective surgical procedure today. She has had symptoms, physical findings, and diagnostic testing that have an appropriate indication for today's planned procedure. The patient has been educated about their condition, conservative and surgical options was been offered to the patient, and the patient has decided to proceed with a surgical option. An informed consent has been signed under no duress or confusion. If indicated, the patient has been surgically cleared by her PCP and /or any pertinent specialist. All labs and testing have been reviewed. If of reproductive age and without permanent sterilization, a pregnancy test was confirmed as negative. The patient has satisfactorily completed any pre-operative preparations prior to surgery. If MRSA positive, she had completed the standard surgical preparation protocol. The patient took any required medicines prior to surgery with a sip of water but otherwise had taken nothing by mouth. The patient has discontinued any blood thinners as required to proceed with surgery. The patient denies chest pain, shortness of breath, calf pain, or open sores on the day of surgery. All dentures and body jewelry have been removed and / or taped. REVIEW OF SYSTEMS:  14 point ROS negative except for above listed in HPI.    General/Constitutional: Denies chills, fever, headaches, weight gain, weight loss   Ophthalmologic: Denies recent abrupt vision changes, blurry vision   ENT: Denies nasal discharge, congestion, sinus pain, sore throat,

## 2023-07-17 NOTE — DISCHARGE SUMMARY
Urogynecology Discharge Summary  [facility]      Patient Name: Ricardo Neff  Patient : 1955  Primary Care Physician: Arabella Back MD  Admit Date: 2023    Principal Diagnosis: Recurrent UTI    Other Diagnosis:   Urinary frequency [R35.0]  Recurrent UTI [N39.0]  Patient Active Problem List   Diagnosis    Optic atrophy    STEMI (ST elevation myocardial infarction) ([address])    Multiple sclerosis ([address])    Tobacco abuse    Coronary artery disease involving native coronary artery of native heart without angina pectoris    S/P primary angioplasty with coronary stent    Mixed hyperlipidemia    Optic neuropathy, inflammatory    Ocular hypertension, bilateral    Pseudophakia of both eyes    S/p Cystoscopy with bladder biopsy 23       Infection: No  Hospital Acquired: N/A    Surgical Operations & Procedures: Cystoscopy with bladder biopsy    Consultations: Anesthesia    Pertinent Findings & Procedures:   Ricardo Neff is a 69 y.o. female No obstetric history on file. , admitted for surgical management of her recurrent UTIs. She underwent Cystoscopy with bladder biopsy on 23. Post-op course normal, discharged home on POD# 0. Follow up in 1 week. Discharge instructions reviewed and questions answered.     Course of patient: normal    Discharge to: Home    Readmission planned: No    Recommendations on Discharge:     Medications:     Medication List        START taking these medications      sulfamethoxazole-trimethoprim 800-160 MG per tablet  Commonly known as: BACTRIM DS;SEPTRA DS  Take 1 tablet by mouth 2 times daily for 10 days            CONTINUE taking these medications      aspirin 81 MG chewable tablet  Take 1 tablet by mouth daily     BLINK TEARS OP     CALCIUM CITRATE + D PO     clopidogrel 75 MG tablet  Commonly known as: PLAVIX  take 1 tablet by mouth once daily     CRESTOR PO     cyanocobalamin 1000 MCG/ML injection     FOSAMAX PO     losartan 25 MG tablet  Commonly

## 2023-07-17 NOTE — DISCHARGE INSTRUCTIONS
site.  Gently pull the catheter until it comes out. You can tell how far in the new catheter should go from the length of the catheter you took out. Clean the skin at the site again. 13    Make sure the drainage bag is attached to the new catheter. Gently put the new catheter in as far as the old one had been. Once urine begins to flow, inflate the bulb with about 8-10 mL water or the amount given on the package. If you have pain or feel resistance, withdraw the catheter a little and try again. If you cannot get the catheter in, cover the opening and call your caregiver right away. Secure the catheter with a new bandage. Tubing should be loose so it does not pull on the catheter. Throw away your gloves and any old catheter supplies. What Problems Could Happen? Infection    * Bleeding   Kidney damage   * Bladder injury   Bladder stones   * Catheter gets displaced causing a leak or blockage      When Do I Need to Call the Doctor? Signs of infection - these include a fever of 100.4F (38C) or higher, chills, stomach pain. Signs of wound infection - these include swelling, redness, warmth around the catheter site, too much pain when touched, yellowish/greenish or bloody discharge, foul smell coming from the site. Cloudy or foul smelling urine. Blood in urine. Stones or sediment in the tubing or drainage bag. Soreness near the tube. Very bad bladder pain or spasms. Urine leaking around the tube. No urine flowing into the bag; this could be a sign that the tube is clogged. Tubing comes out. Helpful Tips:   To lower your chance if infection or other problems:   Always wash your hands before and after you care for your catheter. Check your catheter often to be sure it is in the right position and secure to avoid leakage at the stoma. Keep the urine drain bag below the level of your bladder. Make sure to keep the tubing free of kinks so the urine flows freely.    Avoid

## 2023-07-17 NOTE — ANESTHESIA PRE PROCEDURE
Department of Anesthesiology  Preprocedure Note       Name:  Fernanda Barron   Age:  69 y.o.  :  1955                                          MRN:  [id number]         Date:  2023      Surgeon: Shawn Monaco):  Bisi Brown DO    Procedure: Procedure(s):  CYSTOSCOPY possible BLADDER BIOPSY    Medications prior to admission:   Prior to Admission medications    Medication Sig Start Date End Date Taking? Authorizing Provider   tamsulosin (FLOMAX) 0.4 MG capsule Take 1 capsule by mouth daily for 7 days Please take if discharged home without arevalo catheter. Do not take if discharged home with arevalo catheter. 23  Fidencio Vazquez DO   ondansetron (ZOFRAN-ODT) 4 MG disintegrating tablet Take 1 tablet by mouth every 8 hours as needed for Nausea or Vomiting 23   Fidencio Vazquez DO   ibuprofen (ADVIL;MOTRIN) 600 MG tablet Take 1 tablet by mouth every 6 hours as needed for Pain 23  Fidencio Vazquez DO   Polyethylene Glycol 400 (BLINK TEARS OP) Apply to eye 2 times daily    Historical Provider, MD   Rosuvastatin Calcium (CRESTOR PO) Take 25 mg by mouth daily    Historical Provider, MD   oxybutynin (DITROPAN) 5 MG tablet Take 1 tablet by mouth daily    Historical Provider, MD   Calcium Citrate-Vitamin D (CALCIUM CITRATE + D PO) Take by mouth daily    Historical Provider, MD   Alendronate Sodium (FOSAMAX PO) Take by mouth once a week    Historical Provider, MD   atorvastatin (LIPITOR) 40 MG tablet Take 1 tablet by mouth daily    Historical Provider, MD   clopidogrel (PLAVIX) 75 MG tablet take 1 tablet by mouth once daily 20   Angely Field MD   aspirin 81 MG chewable tablet Take 1 tablet by mouth daily 20   Angely Field MD   losartan (COZAAR) 25 MG tablet take 1 tablet by mouth once daily 19   Jose Steinberg DO   oxyCODONE-acetaminophen (PERCOCET) 5-325 MG per tablet Take 1 tablet by mouth every 4 hours as needed for Pain.     Historical Provider, MD

## 2023-07-18 NOTE — OP NOTE
remove sediment  and then two biopsies were taken in different areas of the dome, one  right, one left. One biopsy was taken for a pathologic tissue specimen. One for culture of aerobic and anaerobic species. A Bugbee cautery was  used with water to then cauterize the two areas of biopsy. The ureters  were patent and effluxed urine well. There was no other pathology  noted. The bladder was drained for patient comfort. The procedure was  felt to be complete and sponge and needle counts were correct. The  patient went to recovery in stable fashion. The family could not be  located for postoperative consult. The patient will go home on  instructions and will go home on Bactrim double strength twice a day for  10 days. We will await biopsies.         Warren Argueta DO    D: 07/17/2023 12:17:55       T: 07/17/2023 12:22:18     [email or web address]  Job#: [id number]     Doc#: [id number]    CC:  Ashley Malcolm

## 2023-07-19 LAB — SURGICAL PATHOLOGY REPORT: NORMAL

## 2023-07-23 LAB
MICROORGANISM SPEC CULT: ABNORMAL
MICROORGANISM/AGENT SPEC: ABNORMAL
MICROORGANISM/AGENT SPEC: ABNORMAL
SPECIMEN DESCRIPTION: ABNORMAL

## 2024-03-25 ENCOUNTER — APPOINTMENT (OUTPATIENT)
Dept: NUCLEAR MEDICINE | Age: 69
End: 2024-03-25
Attending: INTERNAL MEDICINE
Payer: MEDICARE

## 2024-03-25 ENCOUNTER — HOSPITAL ENCOUNTER (OUTPATIENT)
Age: 69
Discharge: HOME | End: 2024-03-27
Attending: INTERNAL MEDICINE
Payer: MEDICARE

## 2024-03-25 ENCOUNTER — APPOINTMENT (OUTPATIENT)
Age: 69
End: 2024-03-25
Attending: INTERNAL MEDICINE
Payer: MEDICARE

## 2024-03-25 VITALS
WEIGHT: 182 LBS | HEIGHT: 66 IN | DIASTOLIC BLOOD PRESSURE: 56 MMHG | BODY MASS INDEX: 29.25 KG/M2 | HEART RATE: 66 BPM | SYSTOLIC BLOOD PRESSURE: 119 MMHG

## 2024-03-25 DIAGNOSIS — R94.31 NONSPECIFIC ABNORMAL ELECTROCARDIOGRAM (ECG) (EKG): ICD-10-CM

## 2024-03-25 DIAGNOSIS — R06.09 DOE (DYSPNEA ON EXERTION): ICD-10-CM

## 2024-03-25 DIAGNOSIS — R06.02 SHORTNESS OF BREATH: ICD-10-CM

## 2024-03-25 LAB
ECHO AO ASC DIAM: 3.6 CM
ECHO AO ASCENDING AORTA INDEX: 1.88 CM/M2
ECHO AO ROOT DIAM: 2.8 CM
ECHO AO ROOT INDEX: 1.46 CM/M2
ECHO AV AREA PEAK VELOCITY: 2 CM2
ECHO AV AREA/BSA PEAK VELOCITY: 1 CM2/M2
ECHO AV PEAK GRADIENT: 9 MMHG
ECHO AV PEAK VELOCITY: 1.5 M/S
ECHO AV VELOCITY RATIO: 0.87
ECHO BSA: 1.96 M2
ECHO EST RA PRESSURE: 3 MMHG
ECHO LA AREA 4C: 16.2 CM2
ECHO LA DIAMETER INDEX: 1.77 CM/M2
ECHO LA DIAMETER: 3.4 CM
ECHO LA MAJOR AXIS: 4.6 CM
ECHO LA TO AORTIC ROOT RATIO: 1.21
ECHO LA VOL MOD A4C: 46 ML (ref 22–52)
ECHO LA VOLUME INDEX MOD A4C: 24 ML/M2 (ref 16–34)
ECHO LV E' LATERAL VELOCITY: 8 CM/S
ECHO LV E' SEPTAL VELOCITY: 8 CM/S
ECHO LV EDV A2C: 63 ML
ECHO LV EDV A4C: 74 ML
ECHO LV EDV INDEX A4C: 39 ML/M2
ECHO LV EDV NDEX A2C: 33 ML/M2
ECHO LV EJECTION FRACTION A2C: 64 %
ECHO LV EJECTION FRACTION A4C: 67 %
ECHO LV EJECTION FRACTION BIPLANE: 67 % (ref 55–100)
ECHO LV ESV A2C: 23 ML
ECHO LV ESV A4C: 24 ML
ECHO LV ESV INDEX A2C: 12 ML/M2
ECHO LV ESV INDEX A4C: 13 ML/M2
ECHO LV FRACTIONAL SHORTENING: 41 % (ref 28–44)
ECHO LV INTERNAL DIMENSION DIASTOLE INDEX: 2.03 CM/M2
ECHO LV INTERNAL DIMENSION DIASTOLIC: 3.9 CM (ref 3.9–5.3)
ECHO LV INTERNAL DIMENSION SYSTOLIC INDEX: 1.2 CM/M2
ECHO LV INTERNAL DIMENSION SYSTOLIC: 2.3 CM
ECHO LV ISOVOLUMETRIC RELAXATION TIME (IVRT): 81 MS
ECHO LV IVSD: 1.1 CM (ref 0.6–0.9)
ECHO LV MASS 2D: 169.4 G (ref 67–162)
ECHO LV MASS INDEX 2D: 88.2 G/M2 (ref 43–95)
ECHO LV POSTERIOR WALL DIASTOLIC: 1.4 CM (ref 0.6–0.9)
ECHO LV RELATIVE WALL THICKNESS RATIO: 0.72
ECHO LVOT AREA: 2.3 CM2
ECHO LVOT DIAM: 1.7 CM
ECHO LVOT PEAK GRADIENT: 7 MMHG
ECHO LVOT PEAK VELOCITY: 1.3 M/S
ECHO MV A VELOCITY: 0.97 M/S
ECHO MV E DECELERATION TIME (DT): 278 MS
ECHO MV E VELOCITY: 1 M/S
ECHO MV E/A RATIO: 1.03
ECHO MV E/E' LATERAL: 12.5
ECHO MV E/E' RATIO (AVERAGED): 12.5
ECHO MV MAX VELOCITY: 1.1 M/S
ECHO MV PEAK GRADIENT: 5 MMHG
ECHO PV MAX VELOCITY: 1.1 M/S
ECHO PV PEAK GRADIENT: 4 MMHG
ECHO RIGHT VENTRICULAR SYSTOLIC PRESSURE (RVSP): 36 MMHG
ECHO TV PEAK GRADIENT: 5 MMHG
ECHO TV REGURGITANT MAX VELOCITY: 2.86 M/S
ECHO TV REGURGITANT PEAK GRADIENT: 33 MMHG

## 2024-03-25 PROCEDURE — 93306 TTE W/DOPPLER COMPLETE: CPT

## 2024-03-25 PROCEDURE — 93306 TTE W/DOPPLER COMPLETE: CPT | Performed by: INTERNAL MEDICINE

## 2024-03-26 ENCOUNTER — TELEPHONE (OUTPATIENT)
Dept: CARDIOLOGY | Age: 69
End: 2024-03-26

## 2024-03-26 NOTE — TELEPHONE ENCOUNTER
Interpretation Summary         Left Ventricle: Normal left ventricular systolic function with a visually estimated EF of 65 - 70%. EF by 2D Simpsons Biplane is 67%. Left ventricle size is normal. Increased wall thickness. Normal wall motion. Normal diastolic function.    Image quality is fair.     Echo Findings    Left Ventricle Normal left ventricular systolic function with a visually estimated EF of 65 - 70%. EF by 2D Simpsons Biplane is 67%. Left ventricle size is normal. Increased wall thickness. Normal wall motion. Normal diastolic function.   Left Atrium Left atrium size is normal.   Right Ventricle Right ventricle size is normal. Normal systolic function.   Right Atrium Right atrium size is normal.   Aortic Valve Valve structure is normal. Trace regurgitation. No stenosis.   Mitral Valve Valve structure is normal. Trace regurgitation. No stenosis noted.   Tricuspid Valve Valve structure is normal. Trace regurgitation. The estimated RVSP is 36 mmHg. No stenosis noted.   Pulmonic Valve Valve structure is normal. No regurgitation. No stenosis noted.   Aorta Normal sized aortic root and ascending aorta.   Pericardium No pericardial effusion.

## 2024-03-26 NOTE — TELEPHONE ENCOUNTER
Patient notified of echo results. Pt verbalized understanding and was transferred to  to reschedule her stress test. Pt verbalized understanding and had no further questions at the time.

## 2024-05-06 ENCOUNTER — HOSPITAL ENCOUNTER (OUTPATIENT)
Age: 69
Discharge: HOME | End: 2024-05-08
Attending: INTERNAL MEDICINE
Payer: MEDICARE

## 2024-05-06 ENCOUNTER — HOSPITAL ENCOUNTER (OUTPATIENT)
Dept: NUCLEAR MEDICINE | Age: 69
Discharge: HOME | End: 2024-05-08
Attending: INTERNAL MEDICINE
Payer: MEDICARE

## 2024-05-06 DIAGNOSIS — R94.31 ABNORMAL EKG: ICD-10-CM

## 2024-05-06 DIAGNOSIS — R06.09 DYSPNEA ON EXERTION: ICD-10-CM

## 2024-05-06 LAB
NUC STRESS EJECTION FRACTION: 65 %
STRESS BASELINE DIAS BP: 78 MMHG
STRESS BASELINE HR: 50 BPM
STRESS BASELINE SYS BP: 146 MMHG
STRESS ESTIMATED WORKLOAD: 4.6 METS
STRESS EXERCISE DUR MIN: 2 MIN
STRESS EXERCISE DUR SEC: 57 SEC
STRESS PEAK DIAS BP: 103 MMHG
STRESS PEAK SYS BP: 201 MMHG
STRESS PERCENT HR ACHIEVED: 78 %
STRESS POST PEAK HR: 117 BPM
STRESS RATE PRESSURE PRODUCT: NORMAL BPM*MMHG
STRESS TARGET HR: 150 BPM
TID: 1.07

## 2024-05-06 PROCEDURE — 78452 HT MUSCLE IMAGE SPECT MULT: CPT | Performed by: INTERNAL MEDICINE

## 2024-05-06 PROCEDURE — 6360000002 HC RX W HCPCS: Performed by: INTERNAL MEDICINE

## 2024-05-06 PROCEDURE — A9500 TC99M SESTAMIBI: HCPCS | Performed by: INTERNAL MEDICINE

## 2024-05-06 PROCEDURE — 3430000000 HC RX DIAGNOSTIC RADIOPHARMACEUTICAL: Performed by: INTERNAL MEDICINE

## 2024-05-06 PROCEDURE — 78452 HT MUSCLE IMAGE SPECT MULT: CPT

## 2024-05-06 PROCEDURE — 93017 CV STRESS TEST TRACING ONLY: CPT

## 2024-05-06 PROCEDURE — 93018 CV STRESS TEST I&R ONLY: CPT | Performed by: INTERNAL MEDICINE

## 2024-05-06 PROCEDURE — 93016 CV STRESS TEST SUPVJ ONLY: CPT | Performed by: INTERNAL MEDICINE

## 2024-05-06 RX ORDER — REGADENOSON 0.08 MG/ML
0.4 INJECTION, SOLUTION INTRAVENOUS ONCE
Status: COMPLETED | OUTPATIENT
Start: 2024-05-06 | End: 2024-05-06

## 2024-05-06 RX ORDER — TETRAKIS(2-METHOXYISOBUTYLISOCYANIDE)COPPER(I) TETRAFLUOROBORATE 1 MG/ML
10 INJECTION, POWDER, LYOPHILIZED, FOR SOLUTION INTRAVENOUS
Status: COMPLETED | OUTPATIENT
Start: 2024-05-06 | End: 2024-05-06

## 2024-05-06 RX ORDER — TETRAKIS(2-METHOXYISOBUTYLISOCYANIDE)COPPER(I) TETRAFLUOROBORATE 1 MG/ML
30 INJECTION, POWDER, LYOPHILIZED, FOR SOLUTION INTRAVENOUS
Status: COMPLETED | OUTPATIENT
Start: 2024-05-06 | End: 2024-05-06

## 2024-05-06 RX ADMIN — Medication 30 MILLICURIE: at 09:20

## 2024-05-06 RX ADMIN — REGADENOSON 0.4 MG: 0.08 INJECTION, SOLUTION INTRAVENOUS at 09:19

## 2024-05-06 RX ADMIN — Medication 10 MILLICURIE: at 08:05

## 2024-10-02 ENCOUNTER — HOSPITAL ENCOUNTER (INPATIENT)
Age: 69
LOS: 2 days | Discharge: HOME | DRG: 398 | End: 2024-10-04
Attending: EMERGENCY MEDICINE | Admitting: SURGERY
Payer: MEDICARE

## 2024-10-02 ENCOUNTER — ANESTHESIA EVENT (OUTPATIENT)
Dept: OPERATING ROOM | Age: 69
End: 2024-10-02
Payer: MEDICARE

## 2024-10-02 ENCOUNTER — ANESTHESIA (OUTPATIENT)
Dept: OPERATING ROOM | Age: 69
End: 2024-10-02
Payer: MEDICARE

## 2024-10-02 DIAGNOSIS — K35.80 ACUTE APPENDICITIS, UNSPECIFIED ACUTE APPENDICITIS TYPE: Primary | ICD-10-CM

## 2024-10-02 DIAGNOSIS — R10.9 ABDOMINAL PAIN, UNSPECIFIED ABDOMINAL LOCATION: ICD-10-CM

## 2024-10-02 DIAGNOSIS — G89.18 POST-OP PAIN: ICD-10-CM

## 2024-10-02 LAB
ANION GAP SERPL CALCULATED.3IONS-SCNC: 11 MMOL/L (ref 9–16)
BASOPHILS # BLD: 0.05 K/UL (ref 0–0.2)
BASOPHILS NFR BLD: 0 % (ref 0–2)
BUN SERPL-MCNC: 17 MG/DL (ref 8–23)
CALCIUM SERPL-MCNC: 9.4 MG/DL (ref 8.6–10.4)
CHLORIDE SERPL-SCNC: 106 MMOL/L (ref 98–107)
CO2 SERPL-SCNC: 22 MMOL/L (ref 20–31)
CREAT SERPL-MCNC: 0.9 MG/DL (ref 0.5–0.9)
EOSINOPHIL # BLD: 0.05 K/UL (ref 0–0.44)
EOSINOPHILS RELATIVE PERCENT: 0 % (ref 1–4)
ERYTHROCYTE [DISTWIDTH] IN BLOOD BY AUTOMATED COUNT: 15.1 % (ref 11.8–14.4)
GFR, ESTIMATED: 66 ML/MIN/1.73M2
GLUCOSE SERPL-MCNC: 127 MG/DL (ref 74–99)
HCT VFR BLD AUTO: 44.8 % (ref 36.3–47.1)
HGB BLD-MCNC: 14.6 G/DL (ref 11.9–15.1)
IMM GRANULOCYTES # BLD AUTO: 0.11 K/UL (ref 0–0.3)
IMM GRANULOCYTES NFR BLD: 1 %
LYMPHOCYTES NFR BLD: 2.18 K/UL (ref 1.1–3.7)
LYMPHOCYTES RELATIVE PERCENT: 16 % (ref 24–43)
MCH RBC QN AUTO: 29.8 PG (ref 25.2–33.5)
MCHC RBC AUTO-ENTMCNC: 32.6 G/DL (ref 28.4–34.8)
MCV RBC AUTO: 91.4 FL (ref 82.6–102.9)
MONOCYTES NFR BLD: 0.68 K/UL (ref 0.1–1.2)
MONOCYTES NFR BLD: 5 % (ref 3–12)
NEUTROPHILS NFR BLD: 78 % (ref 36–65)
NEUTS SEG NFR BLD: 10.66 K/UL (ref 1.5–8.1)
NRBC BLD-RTO: 0 PER 100 WBC
PLATELET # BLD AUTO: 257 K/UL (ref 138–453)
PMV BLD AUTO: 9.9 FL (ref 8.1–13.5)
POTASSIUM SERPL-SCNC: 4.1 MMOL/L (ref 3.7–5.3)
RBC # BLD AUTO: 4.9 M/UL (ref 3.95–5.11)
RBC # BLD: ABNORMAL 10*6/UL
SODIUM SERPL-SCNC: 139 MMOL/L (ref 136–145)
WBC OTHER # BLD: 13.7 K/UL (ref 3.5–11.3)

## 2024-10-02 PROCEDURE — 3600000014 HC SURGERY LEVEL 4 ADDTL 15MIN: Performed by: SURGERY

## 2024-10-02 PROCEDURE — 1200000000 HC SEMI PRIVATE

## 2024-10-02 PROCEDURE — 2580000003 HC RX 258: Performed by: NURSE ANESTHETIST, CERTIFIED REGISTERED

## 2024-10-02 PROCEDURE — 3700000001 HC ADD 15 MINUTES (ANESTHESIA): Performed by: SURGERY

## 2024-10-02 PROCEDURE — 7100000000 HC PACU RECOVERY - FIRST 15 MIN: Performed by: SURGERY

## 2024-10-02 PROCEDURE — 2500000003 HC RX 250 WO HCPCS: Performed by: NURSE ANESTHETIST, CERTIFIED REGISTERED

## 2024-10-02 PROCEDURE — 87086 URINE CULTURE/COLONY COUNT: CPT

## 2024-10-02 PROCEDURE — 6360000002 HC RX W HCPCS: Performed by: ANESTHESIOLOGY

## 2024-10-02 PROCEDURE — 3600000004 HC SURGERY LEVEL 4 BASE: Performed by: SURGERY

## 2024-10-02 PROCEDURE — 88304 TISSUE EXAM BY PATHOLOGIST: CPT

## 2024-10-02 PROCEDURE — 2580000003 HC RX 258

## 2024-10-02 PROCEDURE — 99285 EMERGENCY DEPT VISIT HI MDM: CPT

## 2024-10-02 PROCEDURE — 2720000010 HC SURG SUPPLY STERILE: Performed by: SURGERY

## 2024-10-02 PROCEDURE — 44960 APPENDECTOMY: CPT | Performed by: SURGERY

## 2024-10-02 PROCEDURE — 0DTJ0ZZ RESECTION OF APPENDIX, OPEN APPROACH: ICD-10-PCS | Performed by: SURGERY

## 2024-10-02 PROCEDURE — 2500000003 HC RX 250 WO HCPCS: Performed by: SURGERY

## 2024-10-02 PROCEDURE — 7100000001 HC PACU RECOVERY - ADDTL 15 MIN: Performed by: SURGERY

## 2024-10-02 PROCEDURE — 2580000003 HC RX 258: Performed by: SURGERY

## 2024-10-02 PROCEDURE — 6360000002 HC RX W HCPCS: Performed by: NURSE PRACTITIONER

## 2024-10-02 PROCEDURE — 85025 COMPLETE CBC W/AUTO DIFF WBC: CPT

## 2024-10-02 PROCEDURE — 3700000000 HC ANESTHESIA ATTENDED CARE: Performed by: SURGERY

## 2024-10-02 PROCEDURE — 99222 1ST HOSP IP/OBS MODERATE 55: CPT | Performed by: SURGERY

## 2024-10-02 PROCEDURE — 2709999900 HC NON-CHARGEABLE SUPPLY: Performed by: SURGERY

## 2024-10-02 PROCEDURE — 6360000002 HC RX W HCPCS: Performed by: NURSE ANESTHETIST, CERTIFIED REGISTERED

## 2024-10-02 PROCEDURE — 6370000000 HC RX 637 (ALT 250 FOR IP): Performed by: NURSE PRACTITIONER

## 2024-10-02 PROCEDURE — 6370000000 HC RX 637 (ALT 250 FOR IP)

## 2024-10-02 PROCEDURE — 80048 BASIC METABOLIC PNL TOTAL CA: CPT

## 2024-10-02 PROCEDURE — 6360000002 HC RX W HCPCS

## 2024-10-02 RX ORDER — ASPIRIN 81 MG/1
81 TABLET, CHEWABLE ORAL DAILY
Status: DISCONTINUED | OUTPATIENT
Start: 2024-10-03 | End: 2024-10-05 | Stop reason: HOSPADM

## 2024-10-02 RX ORDER — LIDOCAINE HYDROCHLORIDE AND EPINEPHRINE 10; 10 MG/ML; UG/ML
INJECTION, SOLUTION INFILTRATION; PERINEURAL PRN
Status: DISCONTINUED | OUTPATIENT
Start: 2024-10-02 | End: 2024-10-02 | Stop reason: HOSPADM

## 2024-10-02 RX ORDER — ONDANSETRON 2 MG/ML
4 INJECTION INTRAMUSCULAR; INTRAVENOUS
Status: DISCONTINUED | OUTPATIENT
Start: 2024-10-02 | End: 2024-10-02

## 2024-10-02 RX ORDER — SODIUM CHLORIDE 0.9 % (FLUSH) 0.9 %
5-40 SYRINGE (ML) INJECTION EVERY 12 HOURS SCHEDULED
Status: DISCONTINUED | OUTPATIENT
Start: 2024-10-02 | End: 2024-10-05 | Stop reason: HOSPADM

## 2024-10-02 RX ORDER — NALOXONE HYDROCHLORIDE 0.4 MG/ML
INJECTION, SOLUTION INTRAMUSCULAR; INTRAVENOUS; SUBCUTANEOUS PRN
Status: DISCONTINUED | OUTPATIENT
Start: 2024-10-02 | End: 2024-10-02

## 2024-10-02 RX ORDER — ONDANSETRON 2 MG/ML
4 INJECTION INTRAMUSCULAR; INTRAVENOUS EVERY 6 HOURS PRN
Status: DISCONTINUED | OUTPATIENT
Start: 2024-10-02 | End: 2024-10-05 | Stop reason: HOSPADM

## 2024-10-02 RX ORDER — LIDOCAINE HYDROCHLORIDE 10 MG/ML
INJECTION, SOLUTION EPIDURAL; INFILTRATION; INTRACAUDAL; PERINEURAL
Status: DISCONTINUED | OUTPATIENT
Start: 2024-10-02 | End: 2024-10-02 | Stop reason: SDUPTHER

## 2024-10-02 RX ORDER — SODIUM CHLORIDE, SODIUM LACTATE, POTASSIUM CHLORIDE, CALCIUM CHLORIDE 600; 310; 30; 20 MG/100ML; MG/100ML; MG/100ML; MG/100ML
INJECTION, SOLUTION INTRAVENOUS CONTINUOUS
Status: DISCONTINUED | OUTPATIENT
Start: 2024-10-02 | End: 2024-10-02

## 2024-10-02 RX ORDER — CLOPIDOGREL BISULFATE 75 MG/1
75 TABLET ORAL DAILY
Status: DISCONTINUED | OUTPATIENT
Start: 2024-10-02 | End: 2024-10-05 | Stop reason: HOSPADM

## 2024-10-02 RX ORDER — PHENYLEPHRINE HCL IN 0.9% NACL 1 MG/10 ML
SYRINGE (ML) INTRAVENOUS
Status: DISCONTINUED | OUTPATIENT
Start: 2024-10-02 | End: 2024-10-02 | Stop reason: SDUPTHER

## 2024-10-02 RX ORDER — MAGNESIUM HYDROXIDE 1200 MG/15ML
LIQUID ORAL CONTINUOUS PRN
Status: DISCONTINUED | OUTPATIENT
Start: 2024-10-02 | End: 2024-10-02 | Stop reason: HOSPADM

## 2024-10-02 RX ORDER — ACETAMINOPHEN 500 MG
1000 TABLET ORAL EVERY 8 HOURS SCHEDULED
Status: DISCONTINUED | OUTPATIENT
Start: 2024-10-02 | End: 2024-10-05 | Stop reason: HOSPADM

## 2024-10-02 RX ORDER — FENTANYL CITRATE 50 UG/ML
25 INJECTION, SOLUTION INTRAMUSCULAR; INTRAVENOUS EVERY 5 MIN PRN
Status: COMPLETED | OUTPATIENT
Start: 2024-10-02 | End: 2024-10-02

## 2024-10-02 RX ORDER — OXYCODONE HYDROCHLORIDE 5 MG/1
10 TABLET ORAL EVERY 4 HOURS PRN
Status: DISCONTINUED | OUTPATIENT
Start: 2024-10-02 | End: 2024-10-04

## 2024-10-02 RX ORDER — ONDANSETRON 4 MG/1
4 TABLET, ORALLY DISINTEGRATING ORAL EVERY 8 HOURS PRN
Status: DISCONTINUED | OUTPATIENT
Start: 2024-10-02 | End: 2024-10-05 | Stop reason: HOSPADM

## 2024-10-02 RX ORDER — PROPOFOL 10 MG/ML
INJECTION, EMULSION INTRAVENOUS
Status: DISCONTINUED | OUTPATIENT
Start: 2024-10-02 | End: 2024-10-02 | Stop reason: SDUPTHER

## 2024-10-02 RX ORDER — IBUPROFEN 400 MG/1
400 TABLET, FILM COATED ORAL EVERY 6 HOURS
Status: DISCONTINUED | OUTPATIENT
Start: 2024-10-02 | End: 2024-10-05 | Stop reason: HOSPADM

## 2024-10-02 RX ORDER — DEXAMETHASONE SODIUM PHOSPHATE 10 MG/ML
INJECTION, SOLUTION INTRAMUSCULAR; INTRAVENOUS
Status: DISCONTINUED | OUTPATIENT
Start: 2024-10-02 | End: 2024-10-02 | Stop reason: SDUPTHER

## 2024-10-02 RX ORDER — CEFAZOLIN SODIUM 1 G/3ML
INJECTION, POWDER, FOR SOLUTION INTRAMUSCULAR; INTRAVENOUS
Status: DISCONTINUED | OUTPATIENT
Start: 2024-10-02 | End: 2024-10-02 | Stop reason: SDUPTHER

## 2024-10-02 RX ORDER — SODIUM CHLORIDE 0.9 % (FLUSH) 0.9 %
5-40 SYRINGE (ML) INJECTION EVERY 12 HOURS SCHEDULED
Status: DISCONTINUED | OUTPATIENT
Start: 2024-10-02 | End: 2024-10-02

## 2024-10-02 RX ORDER — OXYCODONE HYDROCHLORIDE 5 MG/1
5 TABLET ORAL EVERY 4 HOURS PRN
Status: DISCONTINUED | OUTPATIENT
Start: 2024-10-02 | End: 2024-10-05 | Stop reason: HOSPADM

## 2024-10-02 RX ORDER — SODIUM CHLORIDE, SODIUM LACTATE, POTASSIUM CHLORIDE, CALCIUM CHLORIDE 600; 310; 30; 20 MG/100ML; MG/100ML; MG/100ML; MG/100ML
INJECTION, SOLUTION INTRAVENOUS CONTINUOUS
Status: DISCONTINUED | OUTPATIENT
Start: 2024-10-02 | End: 2024-10-03

## 2024-10-02 RX ORDER — LOSARTAN POTASSIUM 25 MG/1
25 TABLET ORAL DAILY
Status: DISCONTINUED | OUTPATIENT
Start: 2024-10-02 | End: 2024-10-05 | Stop reason: HOSPADM

## 2024-10-02 RX ORDER — ROCURONIUM BROMIDE 10 MG/ML
INJECTION, SOLUTION INTRAVENOUS
Status: DISCONTINUED | OUTPATIENT
Start: 2024-10-02 | End: 2024-10-02 | Stop reason: SDUPTHER

## 2024-10-02 RX ORDER — ENOXAPARIN SODIUM 100 MG/ML
30 INJECTION SUBCUTANEOUS 2 TIMES DAILY
Status: DISCONTINUED | OUTPATIENT
Start: 2024-10-02 | End: 2024-10-05 | Stop reason: HOSPADM

## 2024-10-02 RX ORDER — POLYETHYLENE GLYCOL 3350 17 G/17G
17 POWDER, FOR SOLUTION ORAL DAILY PRN
Status: DISCONTINUED | OUTPATIENT
Start: 2024-10-02 | End: 2024-10-05 | Stop reason: HOSPADM

## 2024-10-02 RX ORDER — ONDANSETRON 2 MG/ML
INJECTION INTRAMUSCULAR; INTRAVENOUS
Status: DISCONTINUED | OUTPATIENT
Start: 2024-10-02 | End: 2024-10-02 | Stop reason: SDUPTHER

## 2024-10-02 RX ORDER — PROCHLORPERAZINE EDISYLATE 5 MG/ML
5 INJECTION INTRAMUSCULAR; INTRAVENOUS
Status: DISCONTINUED | OUTPATIENT
Start: 2024-10-02 | End: 2024-10-02

## 2024-10-02 RX ORDER — SUCCINYLCHOLINE/SOD CL,ISO/PF 100 MG/5ML
SYRINGE (ML) INTRAVENOUS
Status: DISCONTINUED | OUTPATIENT
Start: 2024-10-02 | End: 2024-10-02 | Stop reason: SDUPTHER

## 2024-10-02 RX ORDER — ASPIRIN 81 MG/1
81 TABLET, CHEWABLE ORAL DAILY
Status: DISCONTINUED | OUTPATIENT
Start: 2024-10-02 | End: 2024-10-02

## 2024-10-02 RX ORDER — SODIUM CHLORIDE 0.9 % (FLUSH) 0.9 %
5-40 SYRINGE (ML) INJECTION PRN
Status: DISCONTINUED | OUTPATIENT
Start: 2024-10-02 | End: 2024-10-05 | Stop reason: HOSPADM

## 2024-10-02 RX ORDER — SODIUM CHLORIDE 9 MG/ML
INJECTION, SOLUTION INTRAVENOUS PRN
Status: DISCONTINUED | OUTPATIENT
Start: 2024-10-02 | End: 2024-10-02

## 2024-10-02 RX ORDER — SODIUM CHLORIDE, SODIUM LACTATE, POTASSIUM CHLORIDE, CALCIUM CHLORIDE 600; 310; 30; 20 MG/100ML; MG/100ML; MG/100ML; MG/100ML
INJECTION, SOLUTION INTRAVENOUS
Status: DISCONTINUED | OUTPATIENT
Start: 2024-10-02 | End: 2024-10-02 | Stop reason: SDUPTHER

## 2024-10-02 RX ORDER — FENTANYL CITRATE 50 UG/ML
INJECTION, SOLUTION INTRAMUSCULAR; INTRAVENOUS
Status: DISCONTINUED | OUTPATIENT
Start: 2024-10-02 | End: 2024-10-02 | Stop reason: SDUPTHER

## 2024-10-02 RX ADMIN — DEXAMETHASONE SODIUM PHOSPHATE 10 MG: 10 INJECTION INTRAMUSCULAR; INTRAVENOUS at 05:18

## 2024-10-02 RX ADMIN — PROPOFOL 120 MG: 10 INJECTION, EMULSION INTRAVENOUS at 04:51

## 2024-10-02 RX ADMIN — OXYCODONE 10 MG: 5 TABLET ORAL at 08:26

## 2024-10-02 RX ADMIN — FENTANYL CITRATE 25 MCG: 50 INJECTION, SOLUTION INTRAMUSCULAR; INTRAVENOUS at 06:47

## 2024-10-02 RX ADMIN — ROSUVASTATIN CALCIUM 25 MG: 20 TABLET, FILM COATED ORAL at 20:32

## 2024-10-02 RX ADMIN — Medication 100 MG: at 04:55

## 2024-10-02 RX ADMIN — ROCURONIUM BROMIDE 10 MG: 10 INJECTION, SOLUTION INTRAVENOUS at 05:33

## 2024-10-02 RX ADMIN — FENTANYL CITRATE 25 MCG: 50 INJECTION, SOLUTION INTRAMUSCULAR; INTRAVENOUS at 06:33

## 2024-10-02 RX ADMIN — SUGAMMADEX 188 MG: 100 INJECTION, SOLUTION INTRAVENOUS at 06:07

## 2024-10-02 RX ADMIN — PIPERACILLIN AND TAZOBACTAM 3375 MG: 3; .375 INJECTION, POWDER, LYOPHILIZED, FOR SOLUTION INTRAVENOUS at 12:33

## 2024-10-02 RX ADMIN — PIPERACILLIN AND TAZOBACTAM 3375 MG: 3; .375 INJECTION, POWDER, LYOPHILIZED, FOR SOLUTION INTRAVENOUS at 20:33

## 2024-10-02 RX ADMIN — SODIUM CHLORIDE, PRESERVATIVE FREE 10 ML: 5 INJECTION INTRAVENOUS at 20:29

## 2024-10-02 RX ADMIN — CEFAZOLIN 2 G: 1 INJECTION, POWDER, FOR SOLUTION INTRAMUSCULAR; INTRAVENOUS at 05:04

## 2024-10-02 RX ADMIN — ROCURONIUM BROMIDE 40 MG: 10 INJECTION, SOLUTION INTRAVENOUS at 04:59

## 2024-10-02 RX ADMIN — LIDOCAINE HYDROCHLORIDE 5 ML: 10 INJECTION, SOLUTION EPIDURAL; INFILTRATION; INTRACAUDAL; PERINEURAL at 04:50

## 2024-10-02 RX ADMIN — ENOXAPARIN SODIUM 30 MG: 100 INJECTION SUBCUTANEOUS at 20:30

## 2024-10-02 RX ADMIN — ONDANSETRON 4 MG: 2 INJECTION INTRAMUSCULAR; INTRAVENOUS at 05:18

## 2024-10-02 RX ADMIN — ACETAMINOPHEN 1000 MG: 500 TABLET ORAL at 20:30

## 2024-10-02 RX ADMIN — FENTANYL CITRATE 25 MCG: 50 INJECTION, SOLUTION INTRAMUSCULAR; INTRAVENOUS at 06:25

## 2024-10-02 RX ADMIN — IBUPROFEN 400 MG: 400 TABLET, FILM COATED ORAL at 11:30

## 2024-10-02 RX ADMIN — HYDROMORPHONE HYDROCHLORIDE 0.25 MG: 1 INJECTION, SOLUTION INTRAMUSCULAR; INTRAVENOUS; SUBCUTANEOUS at 07:09

## 2024-10-02 RX ADMIN — OXYCODONE 5 MG: 5 TABLET ORAL at 12:39

## 2024-10-02 RX ADMIN — Medication 100 MCG: at 05:10

## 2024-10-02 RX ADMIN — FENTANYL CITRATE 100 MCG: 50 INJECTION, SOLUTION INTRAMUSCULAR; INTRAVENOUS at 04:51

## 2024-10-02 RX ADMIN — SODIUM CHLORIDE, POTASSIUM CHLORIDE, SODIUM LACTATE AND CALCIUM CHLORIDE 100 ML/HR: 600; 310; 30; 20 INJECTION, SOLUTION INTRAVENOUS at 18:09

## 2024-10-02 RX ADMIN — HYDROMORPHONE HYDROCHLORIDE 0.5 MG: 1 INJECTION, SOLUTION INTRAMUSCULAR; INTRAVENOUS; SUBCUTANEOUS at 11:30

## 2024-10-02 RX ADMIN — IBUPROFEN 400 MG: 400 TABLET, FILM COATED ORAL at 17:30

## 2024-10-02 RX ADMIN — ONDANSETRON 4 MG: 4 TABLET, ORALLY DISINTEGRATING ORAL at 11:31

## 2024-10-02 RX ADMIN — FENTANYL CITRATE 25 MCG: 50 INJECTION, SOLUTION INTRAMUSCULAR; INTRAVENOUS at 06:58

## 2024-10-02 RX ADMIN — SODIUM CHLORIDE, POTASSIUM CHLORIDE, SODIUM LACTATE AND CALCIUM CHLORIDE: 600; 310; 30; 20 INJECTION, SOLUTION INTRAVENOUS at 04:48

## 2024-10-02 RX ADMIN — ACETAMINOPHEN 1000 MG: 500 TABLET ORAL at 12:39

## 2024-10-02 RX ADMIN — IBUPROFEN 400 MG: 400 TABLET, FILM COATED ORAL at 23:10

## 2024-10-02 RX ADMIN — LOSARTAN POTASSIUM 25 MG: 25 TABLET, FILM COATED ORAL at 08:27

## 2024-10-02 RX ADMIN — Medication 100 MCG: at 05:03

## 2024-10-02 ASSESSMENT — PAIN DESCRIPTION - DESCRIPTORS
DESCRIPTORS: ACHING
DESCRIPTORS: ACHING
DESCRIPTORS: ACHING;DISCOMFORT;THROBBING
DESCRIPTORS: ACHING
DESCRIPTORS: DISCOMFORT;THROBBING
DESCRIPTORS: ACHING
DESCRIPTORS: ACHING;DISCOMFORT;THROBBING
DESCRIPTORS: ACHING;DISCOMFORT
DESCRIPTORS: ACHING;DISCOMFORT;THROBBING
DESCRIPTORS: DISCOMFORT
DESCRIPTORS: ACHING

## 2024-10-02 ASSESSMENT — PAIN DESCRIPTION - LOCATION
LOCATION: ABDOMEN;BACK
LOCATION: ABDOMEN;BACK
LOCATION: ABDOMEN
LOCATION: ABDOMEN;BACK
LOCATION: BACK;ABDOMEN
LOCATION: ABDOMEN;BACK
LOCATION: ABDOMEN;BACK
LOCATION: ABDOMEN
LOCATION: ABDOMEN
LOCATION: BACK;ABDOMEN
LOCATION: ABDOMEN

## 2024-10-02 ASSESSMENT — PAIN SCALES - GENERAL
PAINLEVEL_OUTOF10: 10
PAINLEVEL_OUTOF10: 9
PAINLEVEL_OUTOF10: 4
PAINLEVEL_OUTOF10: 10
PAINLEVEL_OUTOF10: 4
PAINLEVEL_OUTOF10: 6
PAINLEVEL_OUTOF10: 10
PAINLEVEL_OUTOF10: 7
PAINLEVEL_OUTOF10: 6
PAINLEVEL_OUTOF10: 4
PAINLEVEL_OUTOF10: 6
PAINLEVEL_OUTOF10: 10
PAINLEVEL_OUTOF10: 9
PAINLEVEL_OUTOF10: 10

## 2024-10-02 ASSESSMENT — ENCOUNTER SYMPTOMS
BLOOD IN STOOL: 0
SHORTNESS OF BREATH: 0
DIARRHEA: 0
NAUSEA: 1
ABDOMINAL PAIN: 1
COUGH: 0
NAUSEA: 0
BACK PAIN: 0
VOMITING: 1
CONSTIPATION: 0
VOMITING: 0

## 2024-10-02 ASSESSMENT — PAIN DESCRIPTION - ORIENTATION
ORIENTATION: MID;LOWER
ORIENTATION: MID
ORIENTATION: MID
ORIENTATION: MID;LOWER
ORIENTATION: MID

## 2024-10-02 ASSESSMENT — PAIN DESCRIPTION - ONSET: ONSET: ON-GOING

## 2024-10-02 ASSESSMENT — PAIN - FUNCTIONAL ASSESSMENT
PAIN_FUNCTIONAL_ASSESSMENT: PREVENTS OR INTERFERES SOME ACTIVE ACTIVITIES AND ADLS
PAIN_FUNCTIONAL_ASSESSMENT: 0-10

## 2024-10-02 ASSESSMENT — LIFESTYLE VARIABLES
HOW OFTEN DO YOU HAVE A DRINK CONTAINING ALCOHOL: NEVER
HOW MANY STANDARD DRINKS CONTAINING ALCOHOL DO YOU HAVE ON A TYPICAL DAY: PATIENT DOES NOT DRINK

## 2024-10-02 ASSESSMENT — PAIN DESCRIPTION - FREQUENCY: FREQUENCY: CONTINUOUS

## 2024-10-02 ASSESSMENT — PAIN DESCRIPTION - PAIN TYPE: TYPE: SURGICAL PAIN

## 2024-10-02 NOTE — ED PROVIDER NOTES
[facility]A STEPDOWN  Emergency Department Encounter  Emergency Medicine Resident     Pt Name:Madiha Ramon  MRN: [id number]  Birthdate 1955  Date of evaluation: 10/2/24  PCP:  Leona Del Cid MD  Note Started: 2:57 AM EDT      CHIEF COMPLAINT       Chief Complaint   Patient presents with    Abdominal Pain     RLQ abdominal pain       HISTORY OF PRESENT ILLNESS  (Location/Symptom, Timing/Onset, Context/Setting, Quality, Duration, Modifying Factors, Severity.)      Madiha Ramon is a 68 y.o. female presenting to ED via for    CC's: Right lower quadrant tenderness    24 hours right lower quadrant tenderness.  Patient diagnosed appendicitis outside hospital.  Elevated white count.  Status post Vanco Zosyn.  Patient received morphine and Zofran which controlling pain currently.  Patient with labs approximately 8 hours prior to presentation.    Notable PMHx: Coronary artery disease/MI on Plavix, hyperlipidemia, hypertension    PAST MEDICAL / SURGICAL / SOCIAL / FAMILY HISTORY      has a past medical history of Bilateral pseudophakia, CAD (coronary artery disease), Condyloma acuminata, History of blood transfusion, Hyperlipidemia, Hypertension, MI (myocardial infarction) (HCC), Multiple sclerosis (HCC), Opaque posterior capsule, Open-angle glaucoma, Optic atrophy, and Optic neuritis.       has a past surgical history that includes other surgical history (05/02/2013); Cataract removal with implant (Left, 04/25/2012); Cataract removal with implant (Right, 03/20/2012); Yag capsulotomy (Right, 01/27/2015); Cholecystectomy; Dilation and curettage of uterus; Hysterectomy; Tonsillectomy; Colonoscopy; Cystoscopy (N/A, 05/22/2023); vulvar/perineal biopsy (N/A, 05/22/2023); Urethral Surgery (N/A, 05/22/2023); skin biopsy; Cystoscopy (07/17/2023); Cystoscopy (N/A, 07/17/2023); and Appendectomy (10/02/2024).      Social History     Socioeconomic History    Marital status:      Spouse name: Not on file    Number of

## 2024-10-02 NOTE — OP NOTE
Operative Note      Patient: Madiha Ramon  YOB: 1955  MRN: [id number]    Date of Procedure: 10/2/2024    Pre-Op Diagnosis Codes:      * Acute appendicitis, unspecified acute appendicitis type [K35.80]    Post-Op Diagnosis: Same    Procedure:   Laparoscopic appendectomy converted to open    Surgeon(s):  Santos Barksdale MD    Assistant:   Resident: Dali Harley MD; Nelli Giron DO    Anesthesia: General    Estimated Blood Loss (mL): less than 5cc    Complications: None    Specimens:   ID Type Source Tests Collected by Time Destination   1 : URINE Urine Urine, indwelling catheter CULTURE, URINE Santos Barksdale MD 10/2/2024 0619    A : APPENDIX Tissue Appendix SURGICAL PATHOLOGY Santos Barksdale MD 10/2/2024 0549        Implants:  * No implants in log *      Drains:   Urinary Catheter 10/02/24 (Active)       Findings:  Infection Present At Time Of Surgery (PATOS) (choose all levels that have infection present):  - Organ Space infection (below fascia) present as evidenced by phlegmon  Other Findings: Difficult visualization of appendix laparoscopically with adherent inflamed appendix with phlegmon     Detailed Description of Procedure:   Patient was taken to the operating room and placed on the table in supine position.  General anesthesia was induced and patient was intubated.  Perioperative antibiotics were given, SCDs were placed.  A Glass was placed.  The patient's abdomen was then prepped and draped in a sterile fashion.  An appropriate time out was performed prior to skin incision.     A 10mm incision was made in the infraumbilical region and blunt dissection was taken down through the subcutaneous tissue with a hemostat and as retractors until the fascia was approached.  The fascia was grasped at the umbilical stalk and a Veress needle was inserted with negative drop test. Insufflation was then introduced up to fill 15 mmHg, then a 10 mm laparoscope was inserted.  The

## 2024-10-02 NOTE — ED NOTES
Pt to ED 2 via EMS as a tx from [facility]. C/o RLQ abdominal pain that has progressively gotten worse. CT at [facility]. Was + for appendicitis. Pt is transferred here for an appendectomy. Pt RR is even and non-labored on arrival, pt placed on monitor, resident is at the bedside to assess, plan of care ongoing.

## 2024-10-02 NOTE — CONSULTS
General Surgery  H&P    PATIENT NAME: Madiha Ramon  AGE: 68 y.o.  MEDICAL RECORD NO. [id number]  DATE: 10/2/2024  SURGEON: Dr. Barksdale  PRIMARY CARE PHYSICIAN: Leona Del Cid MD    Patient information was obtained from patient.  History/Exam limitations: none.    IMPRESSION:     Patient Active Problem List   Diagnosis    Optic atrophy    STEMI (ST elevation myocardial infarction) (HCC)    Multiple sclerosis (HCC)    Tobacco abuse    Coronary artery disease involving native coronary artery of native heart without angina pectoris    S/P primary angioplasty with coronary stent    Mixed hyperlipidemia    Optic neuropathy, inflammatory    Ocular hypertension, bilateral    Pseudophakia of both eyes    S/p Cystoscopy with bladder biopsy 7/17/23   Acute appendicitis with presence of phlegmon      PLAN:   Will plan to proceed to the operating room for laparoscopic appendectomy possibly open  N.p.o. for OR  IV fluids  Pain and nausea control  Zosyn  Will resume home meds  Will plan to admit to [facility] following procedure      HISTORY:   History of Chief Complaint:    Madiha Ramon is a 68 y.o. female who presents with approximately 24 hours of right lower quadrant abdominal pain.  She has a past medical history of CAD, HTN, HLD, MI.  Patient states on 9/30 patient was able to tolerate dinner following which she began experience right lower quadrant abdominal pain with mild periumbilical pain and associated nausea, vomiting, and chills.  She states that this has persisted to the point where she has no appetite which prompted her to present to the ED in [facility].  She denies fever, shortness of breath, chest pain, diarrhea, constipation, hematochezia, dysuria, increased urgency with urination, air on urination.  She has previous surgical history significant for laparoscopic cholecystectomy and laparoscopic hysterectomy.  She is on aspirin and Plavix for previous coronary stent placement.         Past Medical History    Rhythm: Normal rate.   Pulmonary:      Effort: Pulmonary effort is normal. No respiratory distress.   Abdominal:      General: There is no distension.      Palpations: Abdomen is soft.      Tenderness: There is abdominal tenderness. There is no guarding or rebound.      Comments: Positive tenderness at McBurney's point, negative Rovsing sign   Skin:     General: Skin is warm and dry.   Neurological:      General: No focal deficit present.      Mental Status: She is alert and oriented to person, place, and time.           LABS:   No results for input(s): \"WBC\", \"HGB\", \"HCT\", \"PLT\", \"NA\", \"K\", \"CL\", \"CO2\", \"BUN\", \"CREATININE\", \"MG\", \"PHOS\", \"CALCIUM\", \"INR\", \"AST\", \"ALT\", \"BILITOT\", \"BILIDIR\", \"NITRU\", \"COLORU\", \"BACTERIA\" in the last 72 hours.    Invalid input(s): \"PT\", \"PTT\", \"WBCU\", \"RBCU\", \"LEUKOCYTESUA\"  No results for input(s): \"ALKPHOS\", \"ALT\", \"AST\", \"BILITOT\", \"BILIDIR\", \"LABALBU\", \"AMYLASE\", \"LIPASE\" in the last 72 hours.      RADIOLOGY:     CT from outside facility with findings consistent with acute cholecystitis      Thank you for the interesting evaluation. Further recommendations to follow.    Nelli Giron DO  10/2/2024, 3:31 AM  Attestation signed by Santos Barksdale MD    I personally evaluated the patient and directed the medical decision making with Resident/ZINA after the physical/radiologic exam and laboratory values were reviewed and confirmed. Discussed laparoscopic, possible open appendectomy.  To OR.     Santos Barksdale MD

## 2024-10-02 NOTE — PROGRESS NOTES
Spiritual Health History and Assessment/Progress Note  [facility]    Initial Encounter    Name: Madiha Ramon MRN: [id number]    Age: 68 y.o.     Sex: female   Language: English   Advent: Mandaeism   Acute appendicitis     Date: 10/2/2024            Total Time Calculated: (P) 22 min              Spiritual Assessment began in [facility] ED        Referral/Consult From: Rounding (ED Rounding)   Encounter Overview/Reason: Initial Encounter  Service Provided For: Patient and family together    Narrative: Patient arrived to ED2 due to \"Abdominal Pain.\" Patient was laying flat in hospital bed, with family present in room, when  visited. Patient indicated that she is \"starting to feel better.\" Patient confirmed that she will be admitted to the hospital. Patient and family were coping well and thanked  for support.    Mahi, Belief, Meaning:   Patient has beliefs or practices that help with coping during difficult times  Family/Friends have beliefs or practices that help with coping during difficult times      Importance and Influence:  Patient has spiritual/personal beliefs that influence decisions regarding their health  Family/Friends have spiritual/personal beliefs that influence decisions regarding the patient's health    Community:  Patient feels well-supported. Support system includes: Children  Family/Friends feel well-supported. Support system includes: Extended family    Assessment and Plan of Care:     Patient Interventions include: Affirmed coping skills/support systems  Family/Friends Interventions include: Affirmed coping skills/support systems    Patient Plan of Care: Spiritual Care available upon further referral  Family/Friends Plan of Care: Spiritual Care available upon further referral    Electronically signed by DAVID Kim on 10/2/2024 at 6:01 AM

## 2024-10-02 NOTE — ED NOTES
ED to inpatient nurses report      Chief Complaint:  Chief Complaint   Patient presents with    Abdominal Pain     RLQ abdominal pain     Present to ED from: [facility]    MOA:     LOC: alert and orientated to name, place, date  Mobility: Independent  Oxygen Baseline: RA    Current needs required: None   Pending ED orders: None  Present condition: Stable    Why did the patient come to the ED? Pt to ED 2 via EMS as a tx from [facility]. C/o RLQ abdominal pain that has progressively gotten worse. CT at [facility] Was + for appendicitis. Pt is transferred here for an appendectomy. Pt RR is even and non-labored on arrival, pt placed on monitor, resident is at the bedside to assess, plan of care ongoing.  What is the plan? Appendectomy  Any procedures or intervention occur? Surgery consult  Any safety concerns?? None    Mental Status:  Level of Consciousness: Alert (0)    Psych Assessment:   Psychosocial  Psychosocial (WDL): Within Defined Limits  Vital signs   Vitals:    10/02/24 0258   BP: (!) 144/71   Pulse: 64   Resp: 16   Temp: 100.1 °F (37.8 °C)   TempSrc: Oral   SpO2: 94%   Weight: 93.9 kg (207 lb)   Height: 1.702 m (5' 7\")        Vitals:  Patient Vitals for the past 24 hrs:   BP Temp Temp src Pulse Resp SpO2 Height Weight   10/02/24 0258 (!) 144/71 100.1 °F (37.8 °C) Oral 64 16 94 % 1.702 m (5' 7\") 93.9 kg (207 lb)      Visit Vitals  BP (!) 144/71   Pulse 64   Temp 100.1 °F (37.8 °C) (Oral)   Resp 16   Ht 1.702 m (5' 7\")   Wt 93.9 kg (207 lb)   SpO2 94%   BMI 32.42 kg/m²        LDAs:   Peripheral IV 10/02/24 Right Antecubital (Active)       Ambulatory Status:  Presents to emergency department  because of falls (Syncope, seizure, or loss of consciousness): No, Age > 70: No, Altered Mental Status, Intoxication with alcohol or substance confusion (Disorientation, impaired judgment, poor safety awaremess, or inability to follow instructions): No, Impaired Mobility: Ambulates or transfers with assistive devices or

## 2024-10-02 NOTE — ED PROVIDER NOTES
[facility]     Emergency Department     Faculty Attestation    I performed a history and physical examination of the patient and discussed management with the resident. I have reviewed and agree with the resident’s findings including all diagnostic interpretations, and treatment plans as written. Any areas of disagreement are noted on the chart. I was personally present for the key portions of any procedures. I have documented in the chart those procedures where I was not present during the key portions. I have reviewed the emergency nurses triage note. I agree with the chief complaint, past medical history, past surgical history, allergies, medications, social and family history as documented unless otherwise noted below. Documentation of the HPI, Physical Exam and Medical Decision Making performed by isabel is based on my personal performance of the HPI, PE and MDM. For Physician Assistant/ Nurse Practitioner cases/documentation I have personally evaluated this patient and have completed at least one if not all key elements of the E/M (history, physical exam, and MDM). Additional findings are as noted.    Note Started: 3:01 AM EDT     69 yo F transferred for appendicitis, no nausea, + lower abdominal pain,   PE Na RN escort for exam: t 100.1,   Abdomen tender right lower quadrant, rebound present, guarding present,    General Surgery    EKG Interpretation    Interpreted by me      CRITICAL CARE: There was a high probability of clinically significant/life threatening deterioration in this patient's condition which required my urgent intervention.  Total critical care time was 5 minutes.  This excludes any time for separately reportable procedures.       Timmy Urrutia DO  10/02/24 0300

## 2024-10-02 NOTE — CARE COORDINATION
Case Management Assessment  Initial Evaluation    Date/Time of Evaluation: 10/2/2024 9:05AM  Assessment Completed by: Kayleigh Vidales RN    If patient is discharged prior to next notation, then this note serves as note for discharge by case management.    Patient Name: Madiha Ramon                   YOB: 1955  Diagnosis: Acute appendicitis [K35.80]  Acute appendicitis, unspecified acute appendicitis type [K35.80]                   Date / Time: 10/2/2024  2:54 AM    Patient Admission Status: Inpatient   Readmission Risk (Low < 19, Mod (19-27), High > 27): Readmission Risk Score: 8.9    Current PCP: Leona Del Cid MD  PCP verified by CM? Yes (Leona Del Cid MD)    Chart Reviewed: Yes      History Provided by: Patient  Patient Orientation: Alert and Oriented, Person, Place, Situation    Patient Cognition: Alert    Hospitalization in the last 30 days (Readmission):  No    If yes, Readmission Assessment in CM Navigator will be completed.    Advance Directives:      Code Status: Full Code   Patient's Primary Decision Maker is: Legal Next of Kin      Discharge Planning:    Patient lives with: Children Type of Home: House  Primary Care Giver: Self  Patient Support Systems include: Children   Current Financial resources: Medicare  Current community resources: None  Current services prior to admission: None            Current DME:              Type of Home Care services:  None    ADLS  Prior functional level: Independent in ADLs/IADLs  Current functional level: Independent in ADLs/IADLs    PT AM-PAC:   /24  OT AM-PAC:   /24    Family can provide assistance at DC: No  Would you like Case Management to discuss the discharge plan with any other family members/significant others, and if so, who? No  Plans to Return to Present Housing: Yes  Other Identified Issues/Barriers to RETURNING to current housing: none  Potential Assistance needed at discharge: N/A            Potential DME:    Patient expects to discharge to:

## 2024-10-02 NOTE — PLAN OF CARE
Problem: Discharge Planning  Goal: Discharge to home or other facility with appropriate resources  Outcome: Progressing     Problem: Pain  Goal: Verbalizes/displays adequate comfort level or baseline comfort level  Outcome: Progressing  Flowsheets (Taken 10/2/2024 6970)  Verbalizes/displays adequate comfort level or baseline comfort level:   Assess pain using appropriate pain scale   Encourage patient to monitor pain and request assistance     Problem: Safety - Adult  Goal: Free from fall injury  Outcome: Progressing     Problem: Gastrointestinal - Adult  Goal: Minimal or absence of nausea and vomiting  Outcome: Progressing  Goal: Maintains adequate nutritional intake  Outcome: Progressing     Problem: Skin/Tissue Integrity - Adult  Goal: Skin integrity remains intact  Outcome: Progressing  Goal: Incisions, wounds, or drain sites healing without S/S of infection  Outcome: Progressing

## 2024-10-02 NOTE — ED NOTES
68 F RLQ pain  Started a couple hours pta  CT shows acute appy  Vitals - Normal   17k wbc.   Zosyn

## 2024-10-02 NOTE — BRIEF OP NOTE
Brief Postoperative Note      Patient: Madiha Ramon  YOB: 1955  MRN: [id number]    Date of Procedure: 10/2/2024    Pre-Op Diagnosis Codes:      * Acute appendicitis, unspecified acute appendicitis type [K35.80]    Post-Op Diagnosis: Same       Procedure:   Laparoscopic APPENDECTOMY converted to open    Surgeon(s):  Santos Barksdale MD    Assistant:  Resident: Dali Harley MD; Nelli Giron DO    Anesthesia: General    Estimated Blood Loss (mL): less than 5cc    Complications: None    Specimens:   ID Type Source Tests Collected by Time Destination   1 : URINE Urine Urine, indwelling catheter CULTURE, URINE Santos Barksdale MD 10/2/2024 0619    A : APPENDIX Tissue Appendix SURGICAL PATHOLOGY Santos Barksdale MD 10/2/2024 0549        Implants:  * No implants in log *      Drains:   Urinary Catheter 10/02/24 (Active)       Findings:  Infection Present At Time Of Surgery (PATOS) (choose all levels that have infection present):  - Organ Space infection (below fascia) present as evidenced by phlegmon  Other Findings: Difficult visualization of appendix laparoscopically with adherent inflamed appendix within phlegmon    Electronically signed by Nelli Giron DO on 10/2/2024 at 6:34 AM Present throughout case.

## 2024-10-02 NOTE — ANESTHESIA PRE PROCEDURE
CAPS capsule Take 4,000 Units by mouth daily       sertraline (ZOLOFT) 25 MG tablet Take 1 tablet by mouth daily         Allergies:    Allergies   Allergen Reactions    Ciprofloxacin Hcl Hives    Iodine Hives, Nausea And Vomiting and Other (See Comments)     Almost passed out    Lipitor [Atorvastatin] Other (See Comments)     Muscle cramps/ trouble walking    Lisinopril Other (See Comments)     cough    Contrast [Iodides] Hives, Nausea And Vomiting and Other (See Comments)     syncope    Macrobid [Nitrofurantoin] Nausea And Vomiting       Problem List:    Patient Active Problem List   Diagnosis Code    Optic atrophy H47.20    STEMI (ST elevation myocardial infarction) ([facility]) I21.3    Multiple sclerosis ([facility]) G35    Tobacco abuse Z72.0    Coronary artery disease involving native coronary artery of native heart without angina pectoris I25.10    S/P primary angioplasty with coronary stent Z95.5    Mixed hyperlipidemia E78.2    Optic neuropathy, inflammatory H46.9    Ocular hypertension, bilateral H40.053    Pseudophakia of both eyes Z96.1    S/p Cystoscopy with bladder biopsy 7/17/23 Z98.890    Acute appendicitis K35.80       Past Medical History:        Diagnosis Date    Bilateral pseudophakia     CAD (coronary artery disease)     1 stent    Condyloma acuminata     history of..burned off    History of blood transfusion     Hyperlipidemia     Hypertension     MI (myocardial infarction) ([facility]) 2018    Multiple sclerosis ([facility])     affects vision    Opaque posterior capsule     (Left eye worse than right).    Open-angle glaucoma     Optic atrophy     (right eye).    Optic neuritis     since 06/1996       Past Surgical History:        Procedure Laterality Date    CATARACT REMOVAL WITH IMPLANT Left 04/25/2012    Dr. Alvarez.    CATARACT REMOVAL WITH IMPLANT Right 03/20/2012    Dr. Alvarez.    CHOLECYSTECTOMY      COLONOSCOPY      CYSTOSCOPY N/A 05/22/2023    W/ BOTOX AND VUVA LESION BIOPSY EXCISION    CYSTOSCOPY  07/17/2023

## 2024-10-03 LAB
ANION GAP SERPL CALCULATED.3IONS-SCNC: 8 MMOL/L (ref 9–16)
BUN SERPL-MCNC: 23 MG/DL (ref 8–23)
CALCIUM SERPL-MCNC: 9.6 MG/DL (ref 8.6–10.4)
CHLORIDE SERPL-SCNC: 106 MMOL/L (ref 98–107)
CO2 SERPL-SCNC: 24 MMOL/L (ref 20–31)
CREAT SERPL-MCNC: 0.7 MG/DL (ref 0.5–0.9)
ERYTHROCYTE [DISTWIDTH] IN BLOOD BY AUTOMATED COUNT: 14.9 % (ref 11.8–14.4)
GFR, ESTIMATED: 88 ML/MIN/1.73M2
GLUCOSE SERPL-MCNC: 92 MG/DL (ref 74–99)
HCT VFR BLD AUTO: 37.4 % (ref 36.3–47.1)
HGB BLD-MCNC: 12 G/DL (ref 11.9–15.1)
MCH RBC QN AUTO: 29.9 PG (ref 25.2–33.5)
MCHC RBC AUTO-ENTMCNC: 32.1 G/DL (ref 28.4–34.8)
MCV RBC AUTO: 93 FL (ref 82.6–102.9)
MICROORGANISM SPEC CULT: NORMAL
NRBC BLD-RTO: 0 PER 100 WBC
PLATELET # BLD AUTO: 208 K/UL (ref 138–453)
PMV BLD AUTO: 9.8 FL (ref 8.1–13.5)
POTASSIUM SERPL-SCNC: 4.4 MMOL/L (ref 3.7–5.3)
RBC # BLD AUTO: 4.02 M/UL (ref 3.95–5.11)
SODIUM SERPL-SCNC: 138 MMOL/L (ref 136–145)
SPECIMEN DESCRIPTION: NORMAL
SURGICAL PATHOLOGY REPORT: NORMAL
WBC OTHER # BLD: 9.1 K/UL (ref 3.5–11.3)

## 2024-10-03 PROCEDURE — 6370000000 HC RX 637 (ALT 250 FOR IP): Performed by: NURSE PRACTITIONER

## 2024-10-03 PROCEDURE — 99024 POSTOP FOLLOW-UP VISIT: CPT | Performed by: PHYSICIAN ASSISTANT

## 2024-10-03 PROCEDURE — 6370000000 HC RX 637 (ALT 250 FOR IP)

## 2024-10-03 PROCEDURE — 2500000003 HC RX 250 WO HCPCS: Performed by: PHYSICIAN ASSISTANT

## 2024-10-03 PROCEDURE — 80048 BASIC METABOLIC PNL TOTAL CA: CPT

## 2024-10-03 PROCEDURE — 6360000002 HC RX W HCPCS

## 2024-10-03 PROCEDURE — 2580000003 HC RX 258

## 2024-10-03 PROCEDURE — 36415 COLL VENOUS BLD VENIPUNCTURE: CPT

## 2024-10-03 PROCEDURE — 1200000000 HC SEMI PRIVATE

## 2024-10-03 PROCEDURE — 85027 COMPLETE CBC AUTOMATED: CPT

## 2024-10-03 RX ORDER — DEXTROSE MONOHYDRATE, SODIUM CHLORIDE, AND POTASSIUM CHLORIDE 50; 1.49; 4.5 G/1000ML; G/1000ML; G/1000ML
INJECTION, SOLUTION INTRAVENOUS CONTINUOUS
Status: DISCONTINUED | OUTPATIENT
Start: 2024-10-03 | End: 2024-10-04

## 2024-10-03 RX ADMIN — IBUPROFEN 400 MG: 400 TABLET, FILM COATED ORAL at 04:24

## 2024-10-03 RX ADMIN — ACETAMINOPHEN 1000 MG: 500 TABLET ORAL at 05:54

## 2024-10-03 RX ADMIN — POTASSIUM CHLORIDE, DEXTROSE MONOHYDRATE AND SODIUM CHLORIDE: 150; 5; 450 INJECTION, SOLUTION INTRAVENOUS at 16:33

## 2024-10-03 RX ADMIN — PIPERACILLIN AND TAZOBACTAM 3375 MG: 3; .375 INJECTION, POWDER, LYOPHILIZED, FOR SOLUTION INTRAVENOUS at 12:12

## 2024-10-03 RX ADMIN — ACETAMINOPHEN 1000 MG: 500 TABLET ORAL at 15:23

## 2024-10-03 RX ADMIN — ASPIRIN 81 MG 81 MG: 81 TABLET ORAL at 08:09

## 2024-10-03 RX ADMIN — ACETAMINOPHEN 1000 MG: 500 TABLET ORAL at 20:24

## 2024-10-03 RX ADMIN — SERTRALINE HYDROCHLORIDE 25 MG: 50 TABLET ORAL at 15:23

## 2024-10-03 RX ADMIN — IBUPROFEN 400 MG: 400 TABLET, FILM COATED ORAL at 22:23

## 2024-10-03 RX ADMIN — OXYCODONE 5 MG: 5 TABLET ORAL at 13:32

## 2024-10-03 RX ADMIN — OXYCODONE 10 MG: 5 TABLET ORAL at 04:24

## 2024-10-03 RX ADMIN — ROSUVASTATIN CALCIUM 25 MG: 20 TABLET, FILM COATED ORAL at 20:24

## 2024-10-03 RX ADMIN — PIPERACILLIN AND TAZOBACTAM 3375 MG: 3; .375 INJECTION, POWDER, LYOPHILIZED, FOR SOLUTION INTRAVENOUS at 21:00

## 2024-10-03 RX ADMIN — IBUPROFEN 400 MG: 400 TABLET, FILM COATED ORAL at 16:29

## 2024-10-03 RX ADMIN — OXYCODONE 5 MG: 5 TABLET ORAL at 19:16

## 2024-10-03 RX ADMIN — SODIUM CHLORIDE, PRESERVATIVE FREE 10 ML: 5 INJECTION INTRAVENOUS at 20:25

## 2024-10-03 RX ADMIN — IBUPROFEN 400 MG: 400 TABLET, FILM COATED ORAL at 12:04

## 2024-10-03 RX ADMIN — PIPERACILLIN AND TAZOBACTAM 3375 MG: 3; .375 INJECTION, POWDER, LYOPHILIZED, FOR SOLUTION INTRAVENOUS at 04:21

## 2024-10-03 RX ADMIN — ENOXAPARIN SODIUM 30 MG: 100 INJECTION SUBCUTANEOUS at 08:09

## 2024-10-03 RX ADMIN — SODIUM CHLORIDE, PRESERVATIVE FREE 10 ML: 5 INJECTION INTRAVENOUS at 08:09

## 2024-10-03 RX ADMIN — ENOXAPARIN SODIUM 30 MG: 100 INJECTION SUBCUTANEOUS at 20:24

## 2024-10-03 ASSESSMENT — PAIN SCALES - GENERAL
PAINLEVEL_OUTOF10: 5
PAINLEVEL_OUTOF10: 3
PAINLEVEL_OUTOF10: 6
PAINLEVEL_OUTOF10: 2
PAINLEVEL_OUTOF10: 7
PAINLEVEL_OUTOF10: 6
PAINLEVEL_OUTOF10: 2
PAINLEVEL_OUTOF10: 4
PAINLEVEL_OUTOF10: 4
PAINLEVEL_OUTOF10: 3
PAINLEVEL_OUTOF10: 3
PAINLEVEL_OUTOF10: 5
PAINLEVEL_OUTOF10: 4

## 2024-10-03 ASSESSMENT — PAIN DESCRIPTION - DESCRIPTORS
DESCRIPTORS: DISCOMFORT
DESCRIPTORS: DISCOMFORT;SORE;ACHING
DESCRIPTORS: DISCOMFORT
DESCRIPTORS: DISCOMFORT;SORE
DESCRIPTORS: DISCOMFORT

## 2024-10-03 ASSESSMENT — PAIN DESCRIPTION - LOCATION
LOCATION: ABDOMEN

## 2024-10-03 ASSESSMENT — PAIN DESCRIPTION - ORIENTATION
ORIENTATION: MID

## 2024-10-03 ASSESSMENT — PAIN DESCRIPTION - PAIN TYPE: TYPE: SURGICAL PAIN

## 2024-10-03 ASSESSMENT — PAIN DESCRIPTION - FREQUENCY: FREQUENCY: INTERMITTENT

## 2024-10-03 NOTE — PROGRESS NOTES
PROGRESS NOTE          PATIENT NAME: Madiha Ramon  MEDICAL RECORD NO. [id number]  DATE: 10/3/2024    HD: # 1      Patient Active Problem List   Diagnosis    Optic atrophy    STEMI (ST elevation myocardial infarction) ([facility])    Multiple sclerosis ([facility])    Tobacco abuse    Coronary artery disease involving native coronary artery of native heart without angina pectoris    S/P primary angioplasty with coronary stent    Mixed hyperlipidemia    Optic neuropathy, inflammatory    Ocular hypertension, bilateral    Pseudophakia of both eyes    S/p Cystoscopy with bladder biopsy 7/17/23    Acute appendicitis       DIAGNOSIS AND PLAN    68 year old female with acute appendicitis s/p open appendectomy 10/2  Awaiting return of bowel function, continue sips/chips as tolerated  Encourage up out of bed, IS  Glass discontinued  Will resume home Asa and Plavix when diet resumed.    Chief Complaint: \"doing ok\"    SUBJECTIVE  No acute events overnight. Her pain is controlled and she has been up to the bathroom. No flatus or BM yet. She had some nausea initially but is feeling better now.     OBJECTIVE  VITALS:   Vitals:    10/03/24 1204   BP:    Pulse: 54   Resp: 13   Temp:    SpO2: 92%     Physical Exam  Constitutional:       Appearance: Normal appearance.   HENT:      Head: Normocephalic and atraumatic.   Cardiovascular:      Rate and Rhythm: Normal rate.   Pulmonary:      Effort: Pulmonary effort is normal.   Abdominal:      General: There is no distension.      Palpations: Abdomen is soft.      Tenderness: There is abdominal tenderness.      Comments: Dressing is clean and dry, not taken down at this time, appropriately tender   Skin:     General: Skin is warm and dry.   Neurological:      Mental Status: She is alert.   Psychiatric:         Mood and Affect: Mood normal.       LAB:  CBC:   Recent Labs     10/02/24  0643 10/03/24  0937   WBC 13.7* 9.1   HGB 14.6 12.0   HCT 44.8 37.4   MCV 91.4 93.0    208     BMP:   Recent  Labs     10/02/24  0643 10/03/24  0937    138   K 4.1 4.4    106   CO2 22 24   BUN 17 23   CREATININE 0.9 0.7   GLUCOSE 127* 92       RADIOLOGY:      Susan Leon PA-C  10/3/2024, 12:59 PM

## 2024-10-03 NOTE — H&P
Please see consult note from this date of service.    Nelli Giron, DO  General Surgery Resident PGY-2

## 2024-10-03 NOTE — PLAN OF CARE
Problem: Discharge Planning  Goal: Discharge to home or other facility with appropriate resources  Outcome: Progressing     Problem: Pain  Goal: Verbalizes/displays adequate comfort level or baseline comfort level  Outcome: Progressing     Problem: Safety - Adult  Goal: Free from fall injury  Outcome: Progressing     Problem: Gastrointestinal - Adult  Goal: Minimal or absence of nausea and vomiting  Outcome: Progressing  Goal: Maintains adequate nutritional intake  Outcome: Progressing     Problem: Skin/Tissue Integrity - Adult  Goal: Skin integrity remains intact  Outcome: Progressing  Goal: Incisions, wounds, or drain sites healing without S/S of infection  Outcome: Progressing

## 2024-10-03 NOTE — PLAN OF CARE
Problem: Discharge Planning  Goal: Discharge to home or other facility with appropriate resources  10/3/2024 0201 by Lima Rahman RN  Outcome: Progressing  10/2/2024 1645 by Janae Collins RN  Outcome: Progressing     Problem: Pain  Goal: Verbalizes/displays adequate comfort level or baseline comfort level  10/3/2024 0201 by Lima Rahman RN  Outcome: Progressing  10/2/2024 1645 by Janae Collins RN  Outcome: Progressing  Flowsheets (Taken 10/2/2024 0834)  Verbalizes/displays adequate comfort level or baseline comfort level:   Assess pain using appropriate pain scale   Encourage patient to monitor pain and request assistance     Problem: Safety - Adult  Goal: Free from fall injury  10/3/2024 0201 by Lima Rahman RN  Outcome: Progressing  10/2/2024 1645 by Janae Collins RN  Outcome: Progressing     Problem: Gastrointestinal - Adult  Goal: Minimal or absence of nausea and vomiting  10/3/2024 0201 by Lima Rahman RN  Outcome: Progressing  10/2/2024 1645 by Janae Collins RN  Outcome: Progressing  Goal: Maintains adequate nutritional intake  10/3/2024 0201 by Lima Rahman RN  Outcome: Progressing  10/2/2024 1645 by Janae Collins RN  Outcome: Progressing     Problem: Skin/Tissue Integrity - Adult  Goal: Skin integrity remains intact  10/3/2024 0201 by Lima Rahman RN  Outcome: Progressing  10/2/2024 1645 by Janae Collins RN  Outcome: Progressing  Goal: Incisions, wounds, or drain sites healing without S/S of infection  10/3/2024 0201 by Lima Rahman RN  Outcome: Progressing  10/2/2024 1645 by Janae Collins RN  Outcome: Progressing

## 2024-10-04 VITALS
HEIGHT: 67 IN | DIASTOLIC BLOOD PRESSURE: 68 MMHG | OXYGEN SATURATION: 96 % | BODY MASS INDEX: 31.97 KG/M2 | WEIGHT: 203.71 LBS | SYSTOLIC BLOOD PRESSURE: 132 MMHG | HEART RATE: 63 BPM | RESPIRATION RATE: 20 BRPM | TEMPERATURE: 98.6 F

## 2024-10-04 LAB
ANION GAP SERPL CALCULATED.3IONS-SCNC: 7 MMOL/L (ref 9–16)
BASOPHILS # BLD: 0.04 K/UL (ref 0–0.2)
BASOPHILS NFR BLD: 1 % (ref 0–2)
BUN SERPL-MCNC: 19 MG/DL (ref 8–23)
CALCIUM SERPL-MCNC: 9.1 MG/DL (ref 8.6–10.4)
CHLORIDE SERPL-SCNC: 106 MMOL/L (ref 98–107)
CO2 SERPL-SCNC: 23 MMOL/L (ref 20–31)
CREAT SERPL-MCNC: 0.7 MG/DL (ref 0.5–0.9)
EKG ATRIAL RATE: 51 BPM
EKG ATRIAL RATE: 52 BPM
EKG P AXIS: 23 DEGREES
EKG P AXIS: 90 DEGREES
EKG P-R INTERVAL: 172 MS
EKG P-R INTERVAL: 176 MS
EKG Q-T INTERVAL: 438 MS
EKG Q-T INTERVAL: 440 MS
EKG QRS DURATION: 80 MS
EKG QRS DURATION: 80 MS
EKG QTC CALCULATION (BAZETT): 405 MS
EKG QTC CALCULATION (BAZETT): 407 MS
EKG R AXIS: 15 DEGREES
EKG R AXIS: 15 DEGREES
EKG T AXIS: 2 DEGREES
EKG T AXIS: 6 DEGREES
EKG VENTRICULAR RATE: 51 BPM
EKG VENTRICULAR RATE: 52 BPM
EOSINOPHIL # BLD: 0.16 K/UL (ref 0–0.44)
EOSINOPHILS RELATIVE PERCENT: 3 % (ref 1–4)
ERYTHROCYTE [DISTWIDTH] IN BLOOD BY AUTOMATED COUNT: 15.1 % (ref 11.8–14.4)
GFR, ESTIMATED: 87 ML/MIN/1.73M2
GLUCOSE SERPL-MCNC: 98 MG/DL (ref 74–99)
HCT VFR BLD AUTO: 37 % (ref 36.3–47.1)
HGB BLD-MCNC: 11.7 G/DL (ref 11.9–15.1)
IMM GRANULOCYTES # BLD AUTO: <0.03 K/UL (ref 0–0.3)
IMM GRANULOCYTES NFR BLD: 0 %
LYMPHOCYTES NFR BLD: 1.41 K/UL (ref 1.1–3.7)
LYMPHOCYTES RELATIVE PERCENT: 29 % (ref 24–43)
MCH RBC QN AUTO: 30.4 PG (ref 25.2–33.5)
MCHC RBC AUTO-ENTMCNC: 31.6 G/DL (ref 28.4–34.8)
MCV RBC AUTO: 96.1 FL (ref 82.6–102.9)
MONOCYTES NFR BLD: 0.52 K/UL (ref 0.1–1.2)
MONOCYTES NFR BLD: 11 % (ref 3–12)
NEUTROPHILS NFR BLD: 57 % (ref 36–65)
NEUTS SEG NFR BLD: 2.8 K/UL (ref 1.5–8.1)
NRBC BLD-RTO: 0 PER 100 WBC
PLATELET # BLD AUTO: ABNORMAL K/UL (ref 138–453)
PLATELET, FLUORESCENCE: 185 K/UL (ref 138–453)
PLATELETS.RETICULATED NFR BLD AUTO: 3.8 % (ref 1.1–10.3)
POTASSIUM SERPL-SCNC: 4.2 MMOL/L (ref 3.7–5.3)
RBC # BLD AUTO: 3.85 M/UL (ref 3.95–5.11)
RBC # BLD: ABNORMAL 10*6/UL
SODIUM SERPL-SCNC: 136 MMOL/L (ref 136–145)
WBC OTHER # BLD: 5 K/UL (ref 3.5–11.3)

## 2024-10-04 PROCEDURE — 2500000003 HC RX 250 WO HCPCS: Performed by: PHYSICIAN ASSISTANT

## 2024-10-04 PROCEDURE — 85025 COMPLETE CBC W/AUTO DIFF WBC: CPT

## 2024-10-04 PROCEDURE — 99024 POSTOP FOLLOW-UP VISIT: CPT | Performed by: PHYSICIAN ASSISTANT

## 2024-10-04 PROCEDURE — 6370000000 HC RX 637 (ALT 250 FOR IP): Performed by: NURSE PRACTITIONER

## 2024-10-04 PROCEDURE — 36415 COLL VENOUS BLD VENIPUNCTURE: CPT

## 2024-10-04 PROCEDURE — 85055 RETICULATED PLATELET ASSAY: CPT

## 2024-10-04 PROCEDURE — 80048 BASIC METABOLIC PNL TOTAL CA: CPT

## 2024-10-04 PROCEDURE — 6370000000 HC RX 637 (ALT 250 FOR IP)

## 2024-10-04 PROCEDURE — 93005 ELECTROCARDIOGRAM TRACING: CPT | Performed by: SURGERY

## 2024-10-04 PROCEDURE — 6360000002 HC RX W HCPCS

## 2024-10-04 PROCEDURE — 6370000000 HC RX 637 (ALT 250 FOR IP): Performed by: PHYSICIAN ASSISTANT

## 2024-10-04 PROCEDURE — 2580000003 HC RX 258

## 2024-10-04 RX ORDER — OXYCODONE HYDROCHLORIDE 5 MG/1
5 TABLET ORAL EVERY 6 HOURS PRN
Qty: 20 TABLET | Refills: 0 | Status: SHIPPED | OUTPATIENT
Start: 2024-10-04 | End: 2024-10-04

## 2024-10-04 RX ORDER — DOCUSATE SODIUM 100 MG/1
100 CAPSULE, LIQUID FILLED ORAL 2 TIMES DAILY
Status: DISCONTINUED | OUTPATIENT
Start: 2024-10-04 | End: 2024-10-05 | Stop reason: HOSPADM

## 2024-10-04 RX ORDER — OXYCODONE HYDROCHLORIDE 5 MG/1
5 TABLET ORAL EVERY 6 HOURS PRN
Qty: 20 TABLET | Refills: 0 | Status: SHIPPED | OUTPATIENT
Start: 2024-10-04 | End: 2024-10-09

## 2024-10-04 RX ADMIN — DOCUSATE SODIUM 100 MG: 100 CAPSULE, LIQUID FILLED ORAL at 08:47

## 2024-10-04 RX ADMIN — LOSARTAN POTASSIUM 25 MG: 25 TABLET, FILM COATED ORAL at 08:47

## 2024-10-04 RX ADMIN — IBUPROFEN 400 MG: 400 TABLET, FILM COATED ORAL at 06:48

## 2024-10-04 RX ADMIN — ASPIRIN 81 MG 81 MG: 81 TABLET ORAL at 08:47

## 2024-10-04 RX ADMIN — ACETAMINOPHEN 1000 MG: 500 TABLET ORAL at 06:48

## 2024-10-04 RX ADMIN — OXYCODONE 5 MG: 5 TABLET ORAL at 16:48

## 2024-10-04 RX ADMIN — ENOXAPARIN SODIUM 30 MG: 100 INJECTION SUBCUTANEOUS at 08:47

## 2024-10-04 RX ADMIN — OXYCODONE 5 MG: 5 TABLET ORAL at 20:37

## 2024-10-04 RX ADMIN — PIPERACILLIN AND TAZOBACTAM 3375 MG: 3; .375 INJECTION, POWDER, LYOPHILIZED, FOR SOLUTION INTRAVENOUS at 03:18

## 2024-10-04 RX ADMIN — OXYCODONE 10 MG: 5 TABLET ORAL at 03:16

## 2024-10-04 RX ADMIN — PIPERACILLIN AND TAZOBACTAM 3375 MG: 3; .375 INJECTION, POWDER, LYOPHILIZED, FOR SOLUTION INTRAVENOUS at 12:35

## 2024-10-04 RX ADMIN — IBUPROFEN 400 MG: 400 TABLET, FILM COATED ORAL at 12:34

## 2024-10-04 RX ADMIN — IBUPROFEN 400 MG: 400 TABLET, FILM COATED ORAL at 18:12

## 2024-10-04 RX ADMIN — OXYCODONE 5 MG: 5 TABLET ORAL at 11:08

## 2024-10-04 RX ADMIN — SERTRALINE HYDROCHLORIDE 25 MG: 50 TABLET ORAL at 08:47

## 2024-10-04 RX ADMIN — ACETAMINOPHEN 1000 MG: 500 TABLET ORAL at 15:03

## 2024-10-04 RX ADMIN — POTASSIUM CHLORIDE, DEXTROSE MONOHYDRATE AND SODIUM CHLORIDE: 150; 5; 450 INJECTION, SOLUTION INTRAVENOUS at 03:21

## 2024-10-04 ASSESSMENT — PAIN DESCRIPTION - LOCATION
LOCATION: ABDOMEN

## 2024-10-04 ASSESSMENT — PAIN SCALES - GENERAL
PAINLEVEL_OUTOF10: 3
PAINLEVEL_OUTOF10: 4
PAINLEVEL_OUTOF10: 5
PAINLEVEL_OUTOF10: 7
PAINLEVEL_OUTOF10: 3
PAINLEVEL_OUTOF10: 3
PAINLEVEL_OUTOF10: 7
PAINLEVEL_OUTOF10: 0
PAINLEVEL_OUTOF10: 5
PAINLEVEL_OUTOF10: 5

## 2024-10-04 ASSESSMENT — PAIN DESCRIPTION - DESCRIPTORS
DESCRIPTORS: DISCOMFORT

## 2024-10-04 ASSESSMENT — PAIN DESCRIPTION - ORIENTATION: ORIENTATION: MID

## 2024-10-04 NOTE — DISCHARGE INSTRUCTIONS
Discharge Instructions for General Surgery    No lifting above 10Ibs for next 2 weeks.  No soaking in bathtubs or submerging yourself in water for 4 weeks  Resume activity as tolerated, No operating heavy equipment while using narcotics Wash incision gently with soap and water, pat dry.  If steri-strips or surgical glue in place wash gently and leave in place until the glue or strips fall off. (Do not pull/tug)    F/u in trauma/acute care surgery clinic in 1-2 weeks Call your doctor for the following:  Chills  Temperature greater than 101  Pain that is not tolerable despite taking pain medicine as ordered There is increased swelling, redness or warmth at surgical site There is increased drainage or bleeding from surgical site    Recommended diet: regular diet  Depending on your injuries, your doctor may want you to follow a specific diet. Some pain medicine can cause constipation . To avoid this problem:  Drink plenty of fluids.  Eat foods high in fiber , such as:  Whole grain cereals and bread  Fruits and vegtables   Legumes (eg, beans, lentils)      If you are still having problems, talk to your doctor about using laxatives or stool softeners.    General questions or concerns call [phone] for the General Surgery Clinic.  If needed, the clinic fax number is [phone]

## 2024-10-04 NOTE — ANESTHESIA POSTPROCEDURE EVALUATION
Department of Anesthesiology  Postprocedure Note    Patient: Madiha Ramon  MRN: [id number]  YOB: 1955  Date of evaluation: 10/4/2024    Procedure Summary       Date: 10/02/24 Room / Location: [address]    Anesthesia Start: 0448 Anesthesia Stop: 0625    Procedure   OPEN APPENDECTOMY Diagnosis:       Acute appendicitis, unspecified acute appendicitis type      (Acute appendicitis, unspecified acute appendicitis type [K35.80])    Surgeons: Santos Barksdale MD Responsible Provider: Moe Steel MD    Anesthesia Type: general ASA Status: 3 - Emergent            Anesthesia Type: No value filed.    John Phase I: John Score: 9    John Phase II:      Anesthesia Post Evaluation    Patient location during evaluation: floor  Patient participation: complete - patient participated  Level of consciousness: awake and alert  Airway patency: patent  Nausea & Vomiting: no nausea and no vomiting  Cardiovascular status: blood pressure returned to baseline  Respiratory status: acceptable  Hydration status: euvolemic  Comments: No known anesthesia related complication  Multimodal analgesia pain management approach  Pain management: adequate      No notable events documented.

## 2024-10-04 NOTE — PROGRESS NOTES
PROGRESS NOTE          PATIENT NAME: Madiha Ramon  MEDICAL RECORD NO. [id number]  DATE: 10/4/2024    HD: # 2      Patient Active Problem List   Diagnosis    Optic atrophy    STEMI (ST elevation myocardial infarction) ([facility])    Multiple sclerosis ([facility])    Tobacco abuse    Coronary artery disease involving native coronary artery of native heart without angina pectoris    S/P primary angioplasty with coronary stent    Mixed hyperlipidemia    Optic neuropathy, inflammatory    Ocular hypertension, bilateral    Pseudophakia of both eyes    S/p Cystoscopy with bladder biopsy 7/17/23    Acute appendicitis       DIAGNOSIS AND PLAN    68 year old female with acute appendicitis s/p open appendectomy 10/2  Passing flatus, advance diet as tolerated  Encourage up out of bed, IS  Will resume home Asa and Plavix  Possible discharge home later today vs tomorrow    Chief Complaint: \"doing ok\"    SUBJECTIVE  No acute events overnight. Her pain has improved and she is tolerating sips of clears. She is passing flatus, no bowel movement yet.    OBJECTIVE  VITALS:   Vitals:    10/04/24 1138   BP:    Pulse:    Resp: 16   Temp:    SpO2:      Physical Exam  Constitutional:       Appearance: Normal appearance.   HENT:      Head: Normocephalic and atraumatic.   Cardiovascular:      Rate and Rhythm: Normal rate.   Pulmonary:      Effort: Pulmonary effort is normal.   Abdominal:      General: There is no distension.      Palpations: Abdomen is soft.      Tenderness: There is abdominal tenderness.      Comments: Incision is clean, dry and intact. Appropriately tender.    Skin:     General: Skin is warm and dry.   Neurological:      Mental Status: She is alert.   Psychiatric:         Mood and Affect: Mood normal.       LAB:  CBC:   Recent Labs     10/02/24  0643 10/03/24  0937 10/04/24  0833   WBC 13.7* 9.1 5.0   HGB 14.6 12.0 11.7*   HCT 44.8 37.4 37.0   MCV 91.4 93.0 96.1    208 See Reflexed IPF Result     BMP:   Recent Labs

## 2024-10-05 NOTE — DISCHARGE INSTR - DIET

## 2024-10-05 NOTE — DISCHARGE INSTR - COC
Continuity of Care Form    Patient Name: Madiha Ramon   :  1955  MRN:  [id number]    Admit date:  10/2/2024  Discharge date:  ***    Code Status Order: Full Code   Advance Directives:   Advance Care Flowsheet Documentation             Admitting Physician:  Santos Barksdale MD  PCP: Leona Del Cid MD    Discharging Nurse: ***  Discharging Hospital Unit/Room#: 0510/0510-01  Discharging Unit Phone Number: ***    Emergency Contact:   Extended Emergency Contact Information  Primary Emergency Contact: Stephanie Parker   [facility]  Home Phone: [phone]  Mobile Phone: [phone]  Relation: Child  Secondary Emergency Contact: Ezekiel Ramon  Home Phone: [phone]  Relation: Child    Past Surgical History:  Past Surgical History:   Procedure Laterality Date    APPENDECTOMY  10/02/2024    OPEN APPENDECTOMY    CATARACT REMOVAL WITH IMPLANT Left 2012    Dr. Alvarez.    CATARACT REMOVAL WITH IMPLANT Right 2012    Dr. Alvarez.    CHOLECYSTECTOMY      COLONOSCOPY      CYSTOSCOPY N/A 2023    W/ BOTOX AND VUVA LESION BIOPSY EXCISION    CYSTOSCOPY  2023    CYSTOSCOPY N/A 2023    CYSTOSCOPY WITH BLADDER BIOPSY performed by Jeyson Concepcion DO at [facility] OR    DILATION AND CURETTAGE OF UTERUS      HYSTERECTOMY (CERVIX STATUS UNKNOWN)      LAPAROSCOPIC APPENDECTOMY N/A 10/2/2024    ***E2*** OPEN APPENDECTOMY performed by Santos Barksdale MD at [facility] OR    OTHER SURGICAL HISTORY  2013    YAG laser capsulotomy - Left eye.  (Dr. Alvarez).      SKIN BIOPSY      labial- nevus removed    TONSILLECTOMY      URETHRAL SURGERY N/A 2023    CYSTOSCOPY INJECTION BOTOX TO BLADDER WITH LOCAL performed by Jeyson Concepcion DO at [facility] OR    VULVAR/PERINEAL BIOPSY N/A 2023    VULVA LESION BIOPSY EXCISION performed by Jeyson Concepcion DO at [facility] OR    YAG CAPSULOTOMY Right 2015    Dr Alvarez       Immunization History:   Immunization History

## 2024-10-07 LAB
EKG ATRIAL RATE: 51 BPM
EKG P AXIS: 23 DEGREES
EKG P-R INTERVAL: 172 MS
EKG Q-T INTERVAL: 440 MS
EKG QRS DURATION: 80 MS
EKG QTC CALCULATION (BAZETT): 405 MS
EKG R AXIS: 15 DEGREES
EKG T AXIS: 6 DEGREES
EKG VENTRICULAR RATE: 51 BPM

## 2024-10-07 PROCEDURE — 93010 ELECTROCARDIOGRAM REPORT: CPT | Performed by: INTERNAL MEDICINE

## 2024-10-08 NOTE — DISCHARGE SUMMARY
DISCHARGE SUMMARY:    PATIENT NAME:  Madiha Ramon  YOB: 1955  MEDICAL RECORD NO. [id number]  DATE: 10/08/24  PRIMARY CARE PHYSICIAN: Leona Del Cid MD  ADMIT DATE:  10/2/2024    DISCHARGE DATE:  10/4/2024  DISPOSITION:  home/stable  ADMITTING DIAGNOSIS:   Acute appendicitis    DIAGNOSIS:   Patient Active Problem List   Diagnosis    Optic atrophy    STEMI (ST elevation myocardial infarction) ([facility])    Multiple sclerosis ([facility])    Tobacco abuse    Coronary artery disease involving native coronary artery of native heart without angina pectoris    S/P primary angioplasty with coronary stent    Mixed hyperlipidemia    Optic neuropathy, inflammatory    Ocular hypertension, bilateral    Pseudophakia of both eyes    S/p Cystoscopy with bladder biopsy 7/17/23    Acute appendicitis       CONSULTANTS:  None    PROCEDURES:   Laparoscopic converted to open appendectomy 10/2/2024  HOSPITAL COURSE:   Madiha Ramon is a 68 y.o. female who was admitted on 10/2/2024  Hospital Course:   She underwent surgery on 10/2/2024. Postoperatively her pain was controlled. Her arevalo was discontinued. She had return of bowel function and was started on a clear liquid diet. Her diet was advanced to regular diet.  Labs and imaging were followed daily.      On day of discharge Madiha Ramon  was tolerating a regular diet  had adequate analgesia on oral medications  had no signs of complication.  She was deemed medically stable for discharge to Home        PHYSICAL EXAMINATION:        Discharge Vitals:  height is 1.702 m (5' 7\") and weight is 92.4 kg (203 lb 11.3 oz). Her oral temperature is 98.6 °F (37 °C). Her blood pressure is 132/68 and her pulse is 63. Her respiration is 20 and oxygen saturation is 96%.   Exam on day of discharge:  Physical Exam  Constitutional:       Appearance: Normal appearance.   Cardiovascular:      Rate and Rhythm: Normal rate.   Pulmonary:      Effort: Pulmonary effort is normal.   Abdominal:

## 2024-10-22 ENCOUNTER — OFFICE VISIT (OUTPATIENT)
Age: 69
End: 2024-10-22

## 2024-10-22 VITALS
DIASTOLIC BLOOD PRESSURE: 84 MMHG | SYSTOLIC BLOOD PRESSURE: 147 MMHG | WEIGHT: 203 LBS | BODY MASS INDEX: 31.86 KG/M2 | HEIGHT: 67 IN | HEART RATE: 60 BPM

## 2024-10-22 DIAGNOSIS — K35.200 ACUTE APPENDICITIS WITH GENERALIZED PERITONITIS WITHOUT GANGRENE, PERFORATION, OR ABSCESS: Primary | ICD-10-CM

## 2024-10-22 PROCEDURE — 99024 POSTOP FOLLOW-UP VISIT: CPT | Performed by: NURSE PRACTITIONER

## 2024-10-22 NOTE — PROGRESS NOTES
General Surgery   [place] [id number] [facility], [facility] [address]  [place] [address]  [phone]  [email or web address]    Clinic Note - Post-op Patient      Patient: Madiha Ramon  MRN: [id number]  YOB: 1955 (68 y.o.)    Date of Office Visit: October 22, 2024     CC: Post op follow up    SUBJECTIVE:  Madiha Ramon is a 68 y.o. female who is seen at the clinic for post op follow up from open appendectomy [facility] 10/2.  She is 20 days post op.    Tolerating po and stool  Off pain meds except OTC  Some drainage midline incision serous non purulent    Wound staples and drain staples dc without issue  It looks like her wound edge mid is not perfectly approximated and she is having drainage from skin.  Wound length gently probed with q tip without any evidence of wound breakdown            Pathology:     neg      Assessment/Plan:    Wound has small mid area of non approximation which I expect will heal well  She was made an appointment to rtc in 2 weeks and she will cancel if her wound is improved or call for earlier appointment if wound needs looked at        WEI CRUZ, YOANDY - VIVIANA  10/22/2024

## (undated) DEVICE — SUTURE PDS II SZ 0 L60IN ABSRB VLT L65MM TP-1 1/2 CIR Z991G

## (undated) DEVICE — SUTURE MONOCRYL + SZ 4 0 L18IN ABSRB UD PC 3 L16MM 3 8 CIR PRIM MCP845G

## (undated) DEVICE — SYRINGE MED 10ML LUERLOCK TIP W/O SFTY DISP

## (undated) DEVICE — TUBING, SUCTION, 9/32" X 20', STRAIGHT: Brand: MEDLINE INDUSTRIES, INC.

## (undated) DEVICE — STRAP ARMBRD W1.5XL32IN FOAM STR YET SFT W/ HK AND LOOP

## (undated) DEVICE — TROCAR: Brand: KII FIOS FIRST ENTRY

## (undated) DEVICE — ELECTRODE PT RET AD L9FT HI MOIST COND ADH HYDRGEL CORDED

## (undated) DEVICE — 1LYRTR 16FR10ML100%SIL UMS SNP: Brand: MEDLINE INDUSTRIES, INC.

## (undated) DEVICE — SUTURE VCRL SZ 2-0 L27IN ABSRB UD L26MM CT-2 1/2 CIR J269H

## (undated) DEVICE — HYPODERMIC SAFETY NEEDLE: Brand: MAGELLAN

## (undated) DEVICE — MHPB GYN MINOR PACK: Brand: MEDLINE INDUSTRIES, INC.

## (undated) DEVICE — GOWN,SIRUS,NONRNF,SETINSLV,XL,20/CS: Brand: MEDLINE

## (undated) DEVICE — PLUMEPORT SEO LAPAROSCOPIC SMOKE FILTRATION DEVICE: Brand: PLUMEPORT

## (undated) DEVICE — TROCAR: Brand: KII SHIELDED BLADED ACCESS SYSTEM

## (undated) DEVICE — CONTAINER,SPECIMEN,4OZ,OR STRL: Brand: MEDLINE

## (undated) DEVICE — MHPB CYSTO PACK-LF: Brand: MEDLINE INDUSTRIES, INC.

## (undated) DEVICE — SOLUTION SURG PREP SCRUB POV IOD 7.5% 4 OZ

## (undated) DEVICE — Device

## (undated) DEVICE — SOLUTION SCRB 4OZ 2% CHG FOR SURG SCRBBING HND WSH

## (undated) DEVICE — STRAP,POSITIONING,KNEE/BODY,FOAM,4X60": Brand: MEDLINE

## (undated) DEVICE — NEPTUNE E-SEP SMOKE EVACUATION PENCIL, COATED, 70MM BLADE, PUSH BUTTON SWITCH: Brand: NEPTUNE E-SEP

## (undated) DEVICE — SYRINGE,CONTROL,LL,FINGER,GRIP: Brand: MEDLINE INDUSTRIES, INC.

## (undated) DEVICE — GARMENT,MEDLINE,DVT,INT,CALF,MED, GEN2: Brand: MEDLINE

## (undated) DEVICE — GLOVE ORANGE PI 7 1/2   MSG9075

## (undated) DEVICE — DRAPE,UNDRBUT,WHT GRAD PCH,CAPPORT,20/CS: Brand: MEDLINE

## (undated) DEVICE — GLOVE SURG SZ 8 L12IN FNGR THK79MIL GRN LTX FREE

## (undated) DEVICE — SOLUTION ANTIFOG VIS SYS CLEARIFY LAPSCP

## (undated) DEVICE — LIQUIBAND RAPID ADHESIVE 36/CS 0.8ML: Brand: MEDLINE

## (undated) DEVICE — PREP SOL PVP IODINE 4%  4 OZ/BTL

## (undated) DEVICE — DEVICE TRCR 12X9X3IN WHT CLSR DISP OMNICLOSE

## (undated) DEVICE — SUTURE VICRYL + SZ 0 L27IN ABSRB VLT L26MM UR-6 5/8 CIR VCP603H

## (undated) DEVICE — YANKAUER,FLEXIBLE HANDLE,REGLR CAPACITY: Brand: MEDLINE INDUSTRIES, INC.

## (undated) DEVICE — SOLUTION IRRIG 1000ML STRL H2O USP PLAS POUR BTL

## (undated) DEVICE — STAPLER INT L340MM 45MM STD 12 FIRING B FRM PWR + GRIPPING

## (undated) DEVICE — UNDERPANTS MAT L/XL KNIT SEAMLESS CLR CODE WAISTBAND

## (undated) DEVICE — NEEDLE SPNL 22GA L7IN SPINOCAN

## (undated) DEVICE — STRIP,CLOSURE,WOUND,MEDI-STRIP,1/2X4: Brand: MEDLINE

## (undated) DEVICE — APPLICATOR MEDICATED 26 CC SOLUTION HI LT ORNG CHLORAPREP

## (undated) DEVICE — SOLUTION IRRIG 3000ML STRL H2O USP UROMATIC PLAS CONT

## (undated) DEVICE — SOLUTION IRRIG 1000ML 0.9% SOD CHL USP POUR PLAS BTL

## (undated) DEVICE — COUNTER NDL 10 COUNT HLD 20 FOAM BLK SGL MAG

## (undated) DEVICE — INSUFFLATION NEEDLE TO ESTABLISH PNEUMOPERITONEUM.: Brand: INSUFFLATION NEEDLE